# Patient Record
Sex: FEMALE | Race: WHITE | Employment: UNEMPLOYED | URBAN - METROPOLITAN AREA
[De-identification: names, ages, dates, MRNs, and addresses within clinical notes are randomized per-mention and may not be internally consistent; named-entity substitution may affect disease eponyms.]

---

## 2017-03-07 ENCOUNTER — OFFICE VISIT (OUTPATIENT)
Dept: FAMILY MEDICINE CLINIC | Age: 57
End: 2017-03-07

## 2017-03-07 VITALS
DIASTOLIC BLOOD PRESSURE: 76 MMHG | OXYGEN SATURATION: 98 % | SYSTOLIC BLOOD PRESSURE: 118 MMHG | BODY MASS INDEX: 27.6 KG/M2 | WEIGHT: 155.8 LBS | HEART RATE: 68 BPM

## 2017-03-07 DIAGNOSIS — E03.4 HYPOTHYROIDISM DUE TO ACQUIRED ATROPHY OF THYROID: ICD-10-CM

## 2017-03-07 DIAGNOSIS — G89.29 CHRONIC PAIN OF BOTH KNEES: ICD-10-CM

## 2017-03-07 DIAGNOSIS — Z00.00 PHYSICAL EXAM, ANNUAL: Primary | ICD-10-CM

## 2017-03-07 DIAGNOSIS — Z13.6 SCREENING FOR CARDIOVASCULAR CONDITION: ICD-10-CM

## 2017-03-07 DIAGNOSIS — M17.11 PRIMARY OSTEOARTHRITIS OF RIGHT KNEE: ICD-10-CM

## 2017-03-07 DIAGNOSIS — M17.12 PRIMARY OSTEOARTHRITIS OF LEFT KNEE: ICD-10-CM

## 2017-03-07 DIAGNOSIS — Z13.1 SCREENING FOR DIABETES MELLITUS: ICD-10-CM

## 2017-03-07 DIAGNOSIS — M25.562 CHRONIC PAIN OF BOTH KNEES: ICD-10-CM

## 2017-03-07 DIAGNOSIS — M25.561 CHRONIC PAIN OF BOTH KNEES: ICD-10-CM

## 2017-03-07 PROCEDURE — 99396 PREV VISIT EST AGE 40-64: CPT | Performed by: FAMILY MEDICINE

## 2017-03-07 PROCEDURE — 36415 COLL VENOUS BLD VENIPUNCTURE: CPT | Performed by: FAMILY MEDICINE

## 2017-03-07 RX ORDER — CYCLOBENZAPRINE HCL 10 MG
10 TABLET ORAL 2 TIMES DAILY
Qty: 180 TABLET | Refills: 1 | Status: SHIPPED | OUTPATIENT
Start: 2017-03-07 | End: 2017-12-18

## 2017-03-07 RX ORDER — LEVOTHYROXINE SODIUM 88 UG/1
88 TABLET ORAL DAILY
Qty: 90 TABLET | Refills: 3 | Status: SHIPPED | OUTPATIENT
Start: 2017-03-07 | End: 2017-03-10 | Stop reason: SDUPTHER

## 2017-03-07 RX ORDER — NABUMETONE 750 MG/1
750 TABLET, FILM COATED ORAL 2 TIMES DAILY
Qty: 180 TABLET | Refills: 1 | Status: SHIPPED | OUTPATIENT
Start: 2017-03-07 | End: 2017-12-08

## 2017-03-08 LAB
A/G RATIO: 2 (CALC) (ref 0.8–2.6)
ALBUMIN SERPL-MCNC: 4.8 GM/DL (ref 3.5–5.2)
ALP BLD-CCNC: 50 U/L (ref 23–144)
ALT SERPL-CCNC: 17 U/L (ref 0–60)
AST SERPL-CCNC: 24 U/L (ref 0–46)
BILIRUB SERPL-MCNC: 0.3 MG/DL (ref 0–1.2)
BUN / CREAT RATIO: 18 (CALC) (ref 7–25)
BUN BLDV-MCNC: 14 MG/DL (ref 3–29)
CALCIUM SERPL-MCNC: 9.2 MG/DL (ref 8.5–10.5)
CHLORIDE BLD-SCNC: 100 MEQ/L (ref 96–110)
CHOLESTEROL, TOTAL: 245 MG/DL
CO2: 29 MEQ/L (ref 19–32)
COPY(IES) SENT TO:: NORMAL
CREAT SERPL-MCNC: 0.8 MG/DL
GFR SERPL CREATININE-BSD FRML MDRD: 84 ML/MIN/1.73M2
GLOBULIN: 2.4 GM/DL (CALC) (ref 1.9–3.6)
GLUCOSE BLD-MCNC: 68 MG/DL
HDLC SERPL-MCNC: 72 MG/DL
LDL CHOLESTEROL: 146 MG/DL (CALC)
POTASSIUM SERPL-SCNC: 4.6 MEQ/L (ref 3.4–5.3)
SODIUM BLD-SCNC: 142 MEQ/L (ref 135–148)
TOTAL PROTEIN: 7.2 GM/DL (ref 6–8.3)
TRIGL SERPL-MCNC: 135 MG/DL
TSH SERPL DL<=0.05 MIU/L-ACNC: 7.05 MICRO IU/ML (ref 0.4–4)
VLDLC SERPL CALC-MCNC: 27 MG/DL (CALC) (ref 4–38)

## 2017-03-10 ENCOUNTER — HOSPITAL ENCOUNTER (OUTPATIENT)
Dept: PHYSICAL THERAPY | Age: 57
Discharge: HOME | End: 2017-03-31
Attending: ORTHOPAEDIC SURGERY | Admitting: ORTHOPAEDIC SURGERY

## 2017-03-10 DIAGNOSIS — E03.4 HYPOTHYROIDISM DUE TO ACQUIRED ATROPHY OF THYROID: ICD-10-CM

## 2017-03-10 RX ORDER — LEVOTHYROXINE SODIUM 112 UG/1
112 TABLET ORAL DAILY
Qty: 90 TABLET | Refills: 1 | Status: SHIPPED | OUTPATIENT
Start: 2017-03-10 | End: 2017-09-11 | Stop reason: SDUPTHER

## 2017-04-01 ENCOUNTER — HOSPITAL ENCOUNTER (OUTPATIENT)
Dept: OTHER | Age: 57
Discharge: HOME | End: 2017-04-30
Attending: ORTHOPAEDIC SURGERY | Admitting: ORTHOPAEDIC SURGERY

## 2017-08-01 ENCOUNTER — HOSPITAL ENCOUNTER (OUTPATIENT)
Dept: GENERAL RADIOLOGY | Age: 57
Discharge: HOME | End: 2017-08-01
Attending: FAMILY MEDICINE | Admitting: FAMILY MEDICINE

## 2017-08-01 ENCOUNTER — OFFICE VISIT (OUTPATIENT)
Dept: FAMILY MEDICINE CLINIC | Age: 57
End: 2017-08-01

## 2017-08-01 VITALS
WEIGHT: 156.2 LBS | SYSTOLIC BLOOD PRESSURE: 122 MMHG | HEART RATE: 97 BPM | BODY MASS INDEX: 27.67 KG/M2 | TEMPERATURE: 97.2 F | DIASTOLIC BLOOD PRESSURE: 80 MMHG | OXYGEN SATURATION: 98 %

## 2017-08-01 DIAGNOSIS — M25.511 ACUTE PAIN OF RIGHT SHOULDER: Primary | ICD-10-CM

## 2017-08-01 PROCEDURE — 99213 OFFICE O/P EST LOW 20 MIN: CPT | Performed by: FAMILY MEDICINE

## 2017-09-08 ENCOUNTER — TELEPHONE (OUTPATIENT)
Dept: FAMILY MEDICINE CLINIC | Age: 57
End: 2017-09-08

## 2017-09-08 DIAGNOSIS — E03.4 HYPOTHYROIDISM DUE TO ACQUIRED ATROPHY OF THYROID: ICD-10-CM

## 2017-09-11 RX ORDER — LEVOTHYROXINE SODIUM 112 UG/1
112 TABLET ORAL DAILY
Qty: 90 TABLET | Refills: 1 | Status: SHIPPED | OUTPATIENT
Start: 2017-09-11 | End: 2018-03-13 | Stop reason: SDUPTHER

## 2017-11-13 ENCOUNTER — OFFICE VISIT (OUTPATIENT)
Dept: ORTHOPEDIC SURGERY | Age: 57
End: 2017-11-13

## 2017-11-13 ENCOUNTER — HOSPITAL ENCOUNTER (OUTPATIENT)
Dept: OTHER | Age: 57
Discharge: HOME | End: 2017-11-13
Attending: ORTHOPAEDIC SURGERY | Admitting: ORTHOPAEDIC SURGERY

## 2017-11-13 ENCOUNTER — SURG/PROC ORDERS (OUTPATIENT)
Dept: ORTHOPEDIC SURGERY | Age: 57
End: 2017-11-13

## 2017-11-13 ENCOUNTER — TELEPHONE (OUTPATIENT)
Dept: ORTHOPEDIC SURGERY | Age: 57
End: 2017-11-13

## 2017-11-13 VITALS
HEART RATE: 92 BPM | SYSTOLIC BLOOD PRESSURE: 142 MMHG | WEIGHT: 156.31 LBS | BODY MASS INDEX: 27.7 KG/M2 | DIASTOLIC BLOOD PRESSURE: 88 MMHG | HEIGHT: 63 IN

## 2017-11-13 DIAGNOSIS — M25.562 CHRONIC PAIN OF LEFT KNEE: ICD-10-CM

## 2017-11-13 DIAGNOSIS — Q74.1 BIPARTITE PATELLA: ICD-10-CM

## 2017-11-13 DIAGNOSIS — M21.162 ACQUIRED VARUS DEFORMITY KNEE, LEFT: ICD-10-CM

## 2017-11-13 DIAGNOSIS — M17.12 PRIMARY OSTEOARTHRITIS OF LEFT KNEE: ICD-10-CM

## 2017-11-13 DIAGNOSIS — M25.562 CHRONIC PAIN OF LEFT KNEE: Primary | ICD-10-CM

## 2017-11-13 DIAGNOSIS — Z98.890 S/P LEFT KNEE SURGERY: ICD-10-CM

## 2017-11-13 DIAGNOSIS — G89.29 CHRONIC PAIN OF LEFT KNEE: ICD-10-CM

## 2017-11-13 DIAGNOSIS — G89.29 CHRONIC PAIN OF LEFT KNEE: Primary | ICD-10-CM

## 2017-11-13 PROCEDURE — 99214 OFFICE O/P EST MOD 30 MIN: CPT | Performed by: ORTHOPAEDIC SURGERY

## 2017-11-13 PROCEDURE — 73562 X-RAY EXAM OF KNEE 3: CPT | Performed by: ORTHOPAEDIC SURGERY

## 2017-11-13 NOTE — PROGRESS NOTES
stage arthritis which is worsening and is limiting Marcus Yanez's lifestyle due to pain and is unresponsive to conservative treatments. More detailed documentation is in [facility] records (including medical necessity questionnaire scanned into media). Plan (Medical Decision Making):     Diagnosis:  LEFT KNEE DJD (degenerative joint disease): Advanced/Severe/End Stage Arthritis (Symptomatic and Radiographic Osteoarthritis)    I did go over her options including physical therapy, icing, and anti-inflammatories. We talked about brace wear and shoe wear. We talked about injections of cortisone and Synvisc/viscosupplimentation. We talked about surgery. She is failing conservative treatment and she wanted to know more about her surgical options. PRE-OP DISCUSSION for LEFT Knee Total Knee Replacement (TKR): To provide maximum benefit to [facility], all treatment procedures have been achieved by mutual collaboration with shared decision making between [facility] and myself: Dr Carla Addison. Because she failed conservative treatment (outpatient management: injections, braces/orthotics/shoe modification, Physical therapy intervention: ROM, strengthening and flexibility exercises, and medical management: Analgesics, NSAID's, steroids, vitamins and suppliments) we did talk about surgery. I have also discussed with [facility] the other treatment options available to her for this condition. We have today selected to proceed with surgical treatment. She has voiced an understanding that there are other less aggressive treatment options which are available in this situation and she is comfortable with the plan that she has chosen. The goals of TKR surgery are to halt the progression of deformity, reduce pain and improve her health-related quality of life (HRQL). I went over the indication, technique, and incisions involved for TKR surgery.        I stated that I would take out the arthritis and replace the knee with a TKR made of metal and plastic. This is a major operation and does require a commitment to physical therapy after the surgery. I went over the indication, technique, and incisions involved for TKR. I told her that the planned surgery takes about two to two and a half hours. On the day of surgery, I would meet her in the holding area, review the planned procedure and answer all of her questions. I would ghazala the surgical site with my initials. She would be brought back to the operating room where it is cold, there are bright lights, and a lot of people. I told her that she would be made comfortable. I would do the operation, and she would wake up in the recovery room with a bulky dressing on. She would be admitted to the hospital (usual length of stay is 1 - 3 days). While in the hospital, She will receive selena-operative antibiotics and VTE prophylaxis (usually Xarelto or aspirin, compression stockings and intermittent pneumatic compression device (IPC). Her IV is usually discontinued the morning following the surgery. She will receive pain medications and Physical Therapy while in the hospital and also upon discharge from the hospital.    I would like her to ice the knee, elevate her leg, and take the pain medication if needed. I would see her back in the office ten to fourteen days after the surgery to remove the staples and apply steri-strips, take new Xrays and check on her physical therapy progress. I went over the complications of the surgery and this is not an all-inclusive list.  The major complications include:   1). Infection: less than one percent, She would receive antibiotics. She is allergic to Review of patient's allergies indicates no known allergies. 2). Bleeding: less than one percent, I am using a new technique (Tranexamic acid intra-op) which has considerably decreased the surgical blood loss and decreased the transfusion rates. fourth phase is Swelling and Warmth. Around 4-6 weeks, patients will start to compare their total knee replacement knee with their other knee. They note that the total knee replacement knee is still a bit more swollen and a bit more warm compared to the other knee. This is normal.    Fifth phase: The fifth phase is \"my knee makes noises\" phase. It is common for patients to note that the total knee replacement makes noises. I have answered all of her questions to her apparent satisfaction. She is considering her options and will get back to me for a convenient surgical date. She will need medical clearance prior to the surgery. PRE-OP Consultations:     [x]  Medical Consultation: Giles Bertrand MD for medical clearance     She signed the informed consent form. Regarding discharge planning following her LEFT total knee replacement:  She is planning to go HOME following her hospitalization/surgery. RAPT  RISK ASSESSMENT and PREDICTION TOOL    Name: Amirah April  YOB: 1960  Surgeon: Dr Ted Atkinson: Destination at discharge from acute care predicted by score. Score < 6  = extended inpatient rehabilitation  Score 6 - 9  = additional intervention to discharge directly home (Rehab in the home)  Score > 9  = directly home      Patient's Preference Prediction Score Agreed destination   [address] [facility]  Date: 11/13/2017            [x]  PAT ORDERS Placed by Dr Hung Kuo 11/13/17      [x]  Single Item Health Literacy Screening 11/13/17      Single Item Health Literacy Screening (SILS2)  Questionnaire     \"How comfortable are you filling out medical forms by yourself? \"    []  Extremely    [x]  Quite a bit    []  Somewhat    []  A little bit    []  Not at all     [x]  Patient Reported Outcome Surveys completed and scanned into media:    [x]  PROMIS 11/13/17     [x]  KOOS 11/13/17     [x]  Obstructive Sleep Apnea Questionnaire 11/13/17          Risk of NEEL Scoring Criteria:       [x] Low risk:  Yes to 0  2 questions     [] Intermediate risk:  Yes to 3  4 questions     [] High risk:  Yes to 5  8 questions          Return to Clinic/Follow - Up:  Bren Davis was asked to make a follow-up appointment:    [x] After her left knee replacement    Bren Davis was instructed to call the office if her symptoms worsen or if new symptoms appear prior to the surgery. She is specifically instructed to contact the office between now & her scheduled surgery if she has concerns related to her condition. She is welcome to call for an appointment sooner if she has any additional concerns or questions. Patient Education Materials Provided:  [x] TKR Booklet, Anatomic Drawings, treatment algorithm and pre-operative consultation    Patient Instructions       Dr Aidan Bee surgery will be at: [facility] PRIMARY CARE PHYSICIAN FOR A PRE-OPERATIVE HISTORY & PHYSICAL. A history & physical form must be taken to your primary care physician at the time of your appointment. Your doctor will fax your results to the [facility] and give you a copy to bring with you on the day of the surgery. These are only good for 30 days. If you are having a TKR there will be additional testing at the [facility]  The hospital will contact you to set up a time for the testing, physical exam and an educational class. DO NOT EAT OR DRINK ANYTHING AFTER MIDNIGHT THE NIGHT BEFORE YOUR SURGERY. CHECK WITH YOUR PRIMARY CARE PHYSICIAN TO SEE WHAT MEDICATIONS YOU SHOULD TAKE ON THE MORNING OF SURGERY. TAKE THESE MEDICATIONS WITH A SMALL SIP OF WATER. DO NOT TAKE ANY ASPIRIN SEVEN (7) DAYS PRIOR TO SURGERY (unless your Cardiologist advises you to take ASPIRIN).      DO NOT TAKE ANY IBUPROFEN TYPE PRODUCTS (like Advil, Aleve, Naproxen, etc.)  FIVE (5) DAYS PRIOR TO SURGERY. IF YOU NEED SOMETHING FOR PAIN,   PLEASE USE EXTRA-STRENGTH TYLENOL. YOU WILL NEED TO HAVE SOMEONE [facility] ON THE DAY OF SURGERY. IF YOU WERE PROVIDED A SLING, BRACE, ICE MACHINE AND/OR CRUTCHES/WALKER: PLEASE BRING THEM WITH YOU TO [facility] [address] ON THE DAY OF SURGERY. IT IS YOUR RESPONSIBLE TO CHECK YOUR BENEFITS AND VERIFY IF THE [facility] IS IN YOUR NETWORK. General Information and Patient Checklist    History & Physical   All patients who are receiving anesthesia are required to have this form completed by their Primary Care Physician. A history and physical must be completed within 30 days of your scheduled surgery. Depending on your age and medical conditions, you may be required to have additional tests, like an EKG, chest X-ray or blood work. This is outlined on the form for your physician. It is usually a good idea to make sure that your doctor gives you a copy of the completed form for you to bring with you to the hospital on the day of your surgery. Many doctors will fax us the completed form, but having your own copy will avoid delays on the day of surgery. IMPORTANT: If you have a pacemaker or implantable defibrillator, another special form is required to be completed by your cardiologist.    Medication List   Medications can often affect your anesthetic and surgical outcome. You will be asked to make a complete list of ALL medications that you are currently taking. This includes over-the-counter and herbal supplements, as they can have effects on anesthesia, bleeding and healing just like prescription medications. Arrival   You will be asked to arrive at the hospital 2 hours prior to your scheduled procedure. This will enable us to spend adequate time with you and your family during the pre-op process. Eating, Drinking and Misc.  No food or liquids after midnight the night prior to surgery.     No have prescriptions which are written and signed by your doctor (Dr. Carmel Connor). This means that you must call ahead and come in to the office to  the paper prescription and take it to your pharmacy. We are sorry for any inconvenience but this is now the law. Samanta Holloway MD  Board Certified Orthopaedic Surgeon  Knee and Shoulder Surgery Specialist    Contact Information:  Ce Quinn,   [phone], [address]         IMPORTANT NARCOTIC INFORMATION: PLEASE READ     [x] DO NOT share your prescription medication with anyone! Sharing is illegal.  The prescription dose is based on your age, weight, and health problems. Sharing your narcotic prescription can lead to accidental death of the individual for which the prescription was not prescribed. You may not know about his/her addiction problem. [x] Always use the same pharmacy when filling your narcotic prescriptions. [x] DO NOT mix your narcotic prescription with alcohol. Mixing the narcotic prescription medication with alcohol causes depressive effects including breathing problems, organ malfunction, and cardiac arrest.     [x] Always keep your narcotic medication in a locked, secured location. Keep your medication private. This is to avoid individuals from taking your medication without your knowledge. This medication is highly sought after and locking your prescriptions will protect you from being a target of crime. [x] DO NOT stock pile your medication. This also will protect you from being a target of crime. [x] Dispose of any unused medications properly. Do not flush the medications. Look for appropriate waste collection programs in your community and drug take back events. [x] DO NOT drive while taking narcotic pain medication or muscle relaxers.          If you or someone you know struggles with weight issues and joint pain, please check out this important documentary:      [employer] Living\" =  Http://startmovingVIS Researchving.Setred       (YOUTUBE video SMSL FULL SD)         FOR MORE INFORMATION, CHECK OUT THIS RESOURCE    For your convenience, Dr. Carla Addison has provided the following link that may be helpful for you if you would like more information on your Orthopaedic condition:    www. Orthoinfo. org           The phases following a total knee replacement that most patients often encounter. Briefly:    First Phase: First phase is The Pain Phase. There is plenty of pain medication (and pharmacy can be consulted if needed). TKR is a painful operation however pain management has improved significantly. Second Phase: The second phase is Not Sleeping Phase. It is common for total knee replacement patients not to sleep well after total knee replacement. This can go on for several months. Third Phase: The third phase is Depression. The \"not sleeping at night phase\" leads to the third phase in which patients often experience depression/the blues, feeling that \"this will never get any better\". It will get better. Fourth Phase: The fourth phase is Swelling and Warmth. Around 4-6 weeks, patients will start to compare their total knee replacement knee with their other knee. They note that the total knee replacement knee is still a bit more swollen and a bit more warm compared to the other knee. This is normal.    Fifth Phase: The fifth phase is \"my knee makes noises\" phase. It is common for patients to note that the total knee replacement makes noises. Orders Placed This Encounter   Procedures    XR KNEE LEFT (3 VIEWS)     Standing Status:   Future     Number of Occurrences:   1     Standing Expiration Date:   11/13/2018     Order Specific Question:   Reason for exam:     Answer:   Pain       Refills/New Prescriptions:  No orders of the defined types were placed in this encounter.     Today's prescription medications will be e-scribed (when appropriate) to the Patient's Preferred Pharmacy:   [address]  Phone: [phone] Fax: [phone]    [facility] [address], [address] [place] [address] 1  [place] [address]  Phone: [phone] Fax: [phone]         Shaheen Bartholomew MD  Board Certified Orthopaedic Surgeon  Knee and Shoulder Surgery Specialist  Electronically signed by Shaheen Bartholomew MD on 11/13/2017 at 3:49 PM     Contact Information:  Arleth Huang,   [phone], [address]    This dictation was performed with a verbal recognition program [facility]) and it was checked for errors. It is possible that there are still dictated errors within this office note. If so, please bring any errors to my attention for an addendum. All efforts were made to ensure that this office note is accurate. I have personally performed and/or participated in the history, physical exam and medical decision making and reviewed all pertinent clinical information unless otherwise noted.

## 2017-11-13 NOTE — PROGRESS NOTES
Medical Necessity Documentation for TKR surgery: All forms scanned into media today      Prior therapeutic interventions:    [x]  Over the counter treatments:      [x]  Oral supplements: (GC, CS, etc)      [x]  Ointments: (Sports cream, lidocaine creams, etc.)     [x]  Vitamin D     [x]  Tart Cherry Juice    []  Ice therapy    []  Heat therapy    []  Weight loss attempts    [x]  Activity modification    [x]  Ambulatory aids     []  Cane     [x]  Brace     [x]  Other: knee sleeve     [x]  Physical therapy    [x]  Medications      []  Tylenol     [x]  NSAID's: Aleve       If not: Contraindications: Allergy,        Bleeding,        Gastric ulcer,       Blood thinners     []  Narcotics:          [] Yes    [x] No     []  Injections     []  Steroids     []  Viscosupplimentation (Synvisc, Orthovisc, Euflexxa type)    []  Arthroscopic surgery       []  Date:     []  Alternative treatments:      []  Yoga      []  Water therapy     []  Other:     []  Pain in other sites:      []  Other lower extremity      []  Low Back Pain     []  Other:      [x]  Does your knee feel unstable? [x] Yes    [] No     []  Have you had a fall? [] Yes    [x] No     Regarding discharge planning following her LEFT total knee replacement:  She is planning to go HOME following her hospitalization/surgery. RAPT  RISK ASSESSMENT and PREDICTION TOOL    Name: Scot Welch  YOB: 1960  Surgeon: Dr Dunia Quinn         Value Score    1). What is your age group? 50 - 65 years  = 2      66 - 75 years = 1     > 75 years = 0       Your score = 2   2). Gender? Male = 2     Female = 1       Your score = 1   3). How far on average can you walk? Two blocks or more (+/- rest) = 2    (a block is 200 gwmggw=926 ft)  1 - 2 blocks (+/- rest) = 1     Housebound (most of time) = 0       Your score = 2   4). Which gait aid do you use?  None = 2    (more often than not) Single-point stick = 1     Crutches/walker = 0       Your score = 2   5). Do you use community supports? None or one per week = 1    (home help, meals on wheels, district nursing) Two or more per week = 0       Your score = 1   6). Will you live with someone who can care for you after your operation? yes = 3     no = 0       Your score = 3    Your Total Score (out of 12) = 11       Key: Destination at discharge from acute care predicted by score. Score < 6  = extended inpatient rehabilitation  Score 6 - 9  = additional intervention to discharge directly home (Rehab in the home)  Score > 9  = directly home      Patient's Preference Prediction Score Agreed destination   HOME [facility]  Date: 11/13/17           [x]  Single Item Health Literacy Screening 11/13/17    Single Item Health Literacy Screening (SILS2)  Questionnaire     \"How comfortable are you filling out medical forms by yourself? \"    [x]  Extremely    []  Quite a bit    []  Somewhat    []  A little bit    []  Not at all     [x]  Patient Reported Outcome Surveys completed and scanned into media:    [x]  PROMIS 11/13/17     [x]  KOOS 11/13/17     [x]  NEEL Questionaire 11/13/17         Obstructive Sleep Apnea (NEEL) Questionnaire    Snoring? Do you snore loudly (loud enough to be heard through closed doors, or your bed partner elbows you for snoring at night)? [] YES   [x] NO    Tired? Do you often feel tired, fatigued, or sleepy during the daytime (such as falling asleep during driving)? [] YES   [x] NO    Observed? Has anyone observed you stop breathing or choking/gasping during your sleep? [] YES   [x] NO    Pressure? Do you have or are being treated for high blood pressure? [] YES   [x] NO    Neck Size? (measured around Rizwans apple)  For male, is your shirt collar 17 inches or larger? For female, is your shirt collar 16 inches or larger? [] YES   [x] NO    Age older than 48years old? [x] YES   [] NO    Gender = Male   [] YES   [x] NO    Body Mass Index more than 35 kg/m2?    [] YES   [x] NO    Risk of NEEL Scoring Criteria: 1/8       [x] Low risk:  Yes to 0  2 questions     [] Intermediate risk:  Yes to 3  4 questions     [] High risk:  Yes to 5  8 questions

## 2017-11-13 NOTE — TELEPHONE ENCOUNTER
[x]  OARRS ([place] Automated Rx Reporting System):     OARRS Report on Roshni Leon was run today: 11/13/17. Active Cumulative Morphine Equivalent (No patient found) was reviewed by Dr. Caprice Rinaldi. OARRS report scanned into media.
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
## 2017-11-13 NOTE — PATIENT INSTRUCTIONS
Dr Bolivar Fails surgery will be at: [facility] in [facility] PRIMARY CARE PHYSICIAN FOR A PRE-OPERATIVE HISTORY & PHYSICAL. A history & physical form must be taken to your primary care physician at the time of your appointment. Your doctor will fax your results to the [facility] and give you a copy to bring with you on the day of the surgery. These are only good for 30 days. If you are having a TKR there will be additional testing at the [facility]  The hospital will contact you to set up a time for the testing, physical exam and an educational class. DO NOT EAT OR DRINK ANYTHING AFTER MIDNIGHT THE NIGHT BEFORE YOUR SURGERY. CHECK WITH YOUR PRIMARY CARE PHYSICIAN TO SEE WHAT MEDICATIONS YOU SHOULD TAKE ON THE MORNING OF SURGERY. TAKE THESE MEDICATIONS WITH A SMALL SIP OF WATER. DO NOT TAKE ANY ASPIRIN SEVEN (7) DAYS PRIOR TO SURGERY (unless your Cardiologist advises you to take ASPIRIN). DO NOT TAKE ANY IBUPROFEN TYPE PRODUCTS (like Advil, Aleve, Naproxen, etc.)  FIVE (5) DAYS PRIOR TO SURGERY. IF YOU NEED SOMETHING FOR PAIN,   PLEASE USE EXTRA-STRENGTH TYLENOL. YOU WILL NEED TO HAVE SOMEONE [facility] ON THE DAY OF SURGERY. IF YOU WERE PROVIDED A SLING, BRACE, ICE MACHINE AND/OR CRUTCHES/WALKER: PLEASE BRING THEM WITH YOU TO [facility] [address] ON THE DAY OF SURGERY. IT IS YOUR RESPONSIBLE TO CHECK YOUR BENEFITS AND VERIFY IF THE [facility] IS IN YOUR NETWORK. General Information and Patient Checklist    History & Physical   All patients who are receiving anesthesia are required to have this form completed by their Primary Care Physician. A history and physical must be completed within 30 days of your scheduled surgery.   Depending on your age and medical conditions, you may be required to have additional tests, like an EKG, chest X-ray or blood work. This is outlined on the form for your physician. It is usually a good idea to make sure that your doctor gives you a copy of the completed form for you to bring with you to the hospital on the day of your surgery. Many doctors will fax us the completed form, but having your own copy will avoid delays on the day of surgery. IMPORTANT: If you have a pacemaker or implantable defibrillator, another special form is required to be completed by your cardiologist.    Medication List   Medications can often affect your anesthetic and surgical outcome. You will be asked to make a complete list of ALL medications that you are currently taking. This includes over-the-counter and herbal supplements, as they can have effects on anesthesia, bleeding and healing just like prescription medications. Arrival   You will be asked to arrive at the hospital 2 hours prior to your scheduled procedure. This will enable us to spend adequate time with you and your family during the pre-op process. Eating, Drinking and Misc.  No food or liquids after midnight the night prior to surgery.  No smoking for 24 hours prior to surgery.  No alcoholic beverages for 24 hours prior to surgery.  Remove nail polish and make-up. Who & What Should I Bring?  ALL patients receiving anesthesia must have a , 25years of age or older, to drive them home.  It is recommended that a responsible individual be with the patient for 24 hours following surgery.  Wear loose and comfortable clothing, generally an oversized button-up shirt is a good idea for any patient, as many procedures make pulling a shirt over your head difficult. Pull-on or sweat pants are a good idea, too.  All jewelry must be removed prior to surgery and is generally better off left at home. This includes wedding rings and all body piercings.  DO NOT bring any valuable items with you.     Bring your

## 2017-11-20 ENCOUNTER — TELEPHONE (OUTPATIENT)
Dept: ORTHOPEDIC SURGERY | Age: 57
End: 2017-11-20

## 2017-11-27 ENCOUNTER — TELEPHONE (OUTPATIENT)
Dept: FAMILY MEDICINE CLINIC | Age: 57
End: 2017-11-27

## 2017-11-27 ENCOUNTER — OFFICE VISIT (OUTPATIENT)
Dept: FAMILY MEDICINE CLINIC | Age: 57
End: 2017-11-27

## 2017-11-27 VITALS
BODY MASS INDEX: 27.11 KG/M2 | OXYGEN SATURATION: 98 % | DIASTOLIC BLOOD PRESSURE: 86 MMHG | WEIGHT: 153 LBS | HEART RATE: 102 BPM | SYSTOLIC BLOOD PRESSURE: 118 MMHG | HEIGHT: 63 IN

## 2017-11-27 DIAGNOSIS — M17.12 PRIMARY OSTEOARTHRITIS OF LEFT KNEE: Primary | ICD-10-CM

## 2017-11-27 DIAGNOSIS — Z01.818 PREOP EXAMINATION: ICD-10-CM

## 2017-11-27 PROCEDURE — 99242 OFF/OP CONSLTJ NEW/EST SF 20: CPT | Performed by: FAMILY MEDICINE

## 2017-11-27 ASSESSMENT — PATIENT HEALTH QUESTIONNAIRE - PHQ9
1. LITTLE INTEREST OR PLEASURE IN DOING THINGS: 0
2. FEELING DOWN, DEPRESSED OR HOPELESS: 0
SUM OF ALL RESPONSES TO PHQ QUESTIONS 1-9: 0
SUM OF ALL RESPONSES TO PHQ9 QUESTIONS 1 & 2: 0

## 2017-11-27 NOTE — PROGRESS NOTES
Subjective:   Chief Complaint:     Hardeep Antoine is a 56 y.o. female who presents for a  physical examination. History of Present Illness:    Patient with chronic left knee pain and instability. Scheduled for left total knee replacement on 12/6/17. Needs medical clearance. Past Medical History:   Diagnosis Date    Arthritis     Hypothyroidism         Review of patient's past surgical history indicates:     Past Surgical History:   Procedure Laterality Date    KNEE SURGERY Left 1976    by Dr. Blanca Avila in [facility]    by Dr. Blanca Avila in [place]                                                   Current Outpatient Prescriptions   Medication Sig Dispense Refill    levothyroxine (SYNTHROID) 112 MCG tablet Take 1 tablet by mouth daily 90 tablet 1    nabumetone (RELAFEN) 750 MG tablet Take 1 tablet by mouth 2 times daily 180 tablet 1    cyclobenzaprine (FLEXERIL) 10 MG tablet Take 1 tablet by mouth 2 times daily 180 tablet 1    Misc Natural Products (OSTEO BI-FLEX ADV JOINT SHIELD PO) Take by mouth      Naproxen Sodium (ALEVE PO) Take by mouth       No current facility-administered medications for this visit. No Known Allergies    Social History   Substance Use Topics    Smoking status: Former Smoker    Smokeless tobacco: Never Used      Comment: quit 2010    Alcohol use Not on file        No family history on file. Review Of Systems    Skin: no abnormal pigmentation, rash, scaling, itching, masses, hair or nail changes  Eyes: negative  Ears/Nose/Throat: negative  Respiratory: negative  Cardiovascular: negative  Gastrointestinal: negative  Genitourinary: negative  Musculoskeletal: negative  Neurologic: negative  Psychiatric: negative  Hematologic/Lymphatic/Immunologic: negative  Endocrine: negative       Objective:      /86 (Site: Left Arm, Position: Sitting, Cuff Size: Medium Adult)   Pulse 102   Ht 5' 3\" (1.6 m)   Wt 153 lb (69.4 kg)   SpO2 98%   Breastfeeding?  No BMI 27.10 kg/m²   General appearance - healthy, alert, no distress  Skin - Skin color, texture, turgor normal. No rashes or lesions. Head - Normocephalic. No masses, lesions, tenderness or abnormalities  Eyes - conjunctivae/corneas clear. PERRL, EOM's intact. Ears - External ears normal. Canals clear. TM's normal.  Nose/Sinuses - Nares normal. Septum midline. Mucosa normal. No drainage or sinus tenderness. Oropharynx - Lips, mucosa, and tongue normal. Teeth and gums normal.  Neck - Neck supple. No adenopathy. Thyroid symmetric, normal size,  Back - Back symmetric, no curvature. ROM normal. No CVA tenderness. Lungs - Percussion normal. Good diaphragmatic excursion. Lungs clear  Heart - Regular rate and rhythm, with no rub, murmur or gallop noted. Abdomen - Abdomen soft, non-tender. BS normal. No masses, organomegaly  Extremities - Extremities normal. No deformities, edema, or skin discoloration  Musculoskeletal - Spine ROM normal. Muscular strength intact. Peripheral pulses - radial=4/4  Neuro - Gait normal. Reflexes normal and symmetric.  Sensation grossly normal.  No focal weakness           Assessment:    left knee DJD  Knee instability              Plan:   Medically optimized for surgery

## 2017-11-27 NOTE — TELEPHONE ENCOUNTER
Please call Dr. Adelina Lei' office ( [facility]) to see if there is a physical form I need to sign to clear her for surgery.     Thanls

## 2017-11-28 ENCOUNTER — TELEPHONE (OUTPATIENT)
Dept: FAMILY MEDICINE CLINIC | Age: 57
End: 2017-11-28

## 2017-11-28 ENCOUNTER — HOSPITAL ENCOUNTER (OUTPATIENT)
Dept: PREADMISSION TESTING | Age: 57
Discharge: HOME | End: 2017-11-28
Attending: ORTHOPAEDIC SURGERY | Admitting: ORTHOPAEDIC SURGERY

## 2017-11-28 VITALS
TEMPERATURE: 98.2 F | OXYGEN SATURATION: 99 % | DIASTOLIC BLOOD PRESSURE: 78 MMHG | BODY MASS INDEX: 27.97 KG/M2 | HEIGHT: 62 IN | SYSTOLIC BLOOD PRESSURE: 137 MMHG | HEART RATE: 82 BPM | WEIGHT: 152 LBS

## 2017-11-28 LAB
A/G RATIO: 1.4 (ref 1.1–2.2)
ABO/RH: NORMAL
ALBUMIN SERPL-MCNC: 4.3 G/DL (ref 3.4–5)
ALP BLD-CCNC: 53 U/L (ref 40–129)
ALT SERPL-CCNC: 10 U/L (ref 10–40)
ANION GAP SERPL CALCULATED.3IONS-SCNC: 10 MMOL/L (ref 3–16)
ANTIBODY SCREEN: NORMAL
APTT: 31.4 SEC (ref 24.1–34.9)
AST SERPL-CCNC: 16 U/L (ref 15–37)
BACTERIA: ABNORMAL /HPF
BASOPHILS ABSOLUTE: 0.1 K/UL (ref 0–0.2)
BASOPHILS RELATIVE PERCENT: 2.7 %
BILIRUB SERPL-MCNC: 0.3 MG/DL (ref 0–1)
BILIRUBIN URINE: NEGATIVE
BLOOD, URINE: NEGATIVE
BUN BLDV-MCNC: 10 MG/DL (ref 7–20)
C-REACTIVE PROTEIN: 0.9 MG/L (ref 0–5.1)
CALCIUM SERPL-MCNC: 9.3 MG/DL (ref 8.3–10.6)
CHLORIDE BLD-SCNC: 100 MMOL/L (ref 99–110)
CLARITY: CLEAR
CO2: 28 MMOL/L (ref 21–32)
COLOR: YELLOW
CREAT SERPL-MCNC: 0.5 MG/DL (ref 0.6–1.1)
EKG ATRIAL RATE: 71 BPM
EKG DIAGNOSIS: NORMAL
EKG P AXIS: 50 DEGREES
EKG P-R INTERVAL: 162 MS
EKG Q-T INTERVAL: 432 MS
EKG QRS DURATION: 82 MS
EKG QTC CALCULATION (BAZETT): 469 MS
EKG R AXIS: -4 DEGREES
EKG T AXIS: 10 DEGREES
EKG VENTRICULAR RATE: 71 BPM
EOSINOPHILS ABSOLUTE: 0.1 K/UL (ref 0–0.6)
EOSINOPHILS RELATIVE PERCENT: 2 %
EPITHELIAL CELLS, UA: ABNORMAL /HPF
GFR AFRICAN AMERICAN: >60
GFR NON-AFRICAN AMERICAN: >60
GLOBULIN: 3 G/DL
GLUCOSE BLD-MCNC: 95 MG/DL (ref 70–99)
GLUCOSE URINE: NEGATIVE MG/DL
HCT VFR BLD CALC: 41.4 % (ref 36–48)
HEMOGLOBIN: 13.7 G/DL (ref 12–16)
INR BLD: 0.96 (ref 0.85–1.15)
KETONES, URINE: NEGATIVE MG/DL
LEUKOCYTE ESTERASE, URINE: NEGATIVE
LYMPHOCYTES ABSOLUTE: 1.1 K/UL (ref 1–5.1)
LYMPHOCYTES RELATIVE PERCENT: 22.2 %
MCH RBC QN AUTO: 30.9 PG (ref 26–34)
MCHC RBC AUTO-ENTMCNC: 33 G/DL (ref 31–36)
MCV RBC AUTO: 93.5 FL (ref 80–100)
MICROSCOPIC EXAMINATION: ABNORMAL
MONOCYTES ABSOLUTE: 0.3 K/UL (ref 0–1.3)
MONOCYTES RELATIVE PERCENT: 5.8 %
MUCUS: ABNORMAL /LPF
NEUTROPHILS ABSOLUTE: 3.5 K/UL (ref 1.7–7.7)
NEUTROPHILS RELATIVE PERCENT: 67.3 %
NITRITE, URINE: NEGATIVE
PDW BLD-RTO: 13.5 % (ref 12.4–15.4)
PH UA: 6.5
PLATELET # BLD: 328 K/UL (ref 135–450)
PMV BLD AUTO: 9 FL (ref 5–10.5)
POTASSIUM SERPL-SCNC: 4.4 MMOL/L (ref 3.5–5.1)
PROTEIN UA: NEGATIVE MG/DL
PROTHROMBIN TIME: 10.8 SEC (ref 9.6–13)
RBC # BLD: 4.42 M/UL (ref 4–5.2)
RBC UA: ABNORMAL /HPF (ref 0–2)
SEDIMENTATION RATE, ERYTHROCYTE: 30 MM/HR (ref 0–30)
SODIUM BLD-SCNC: 138 MMOL/L (ref 136–145)
SPECIFIC GRAVITY UA: 1.02
TOTAL PROTEIN: 7.3 G/DL (ref 6.4–8.2)
UROBILINOGEN, URINE: 0.2 E.U./DL
WBC # BLD: 5.1 K/UL (ref 4–11)
WBC UA: ABNORMAL /HPF (ref 0–5)

## 2017-11-28 PROCEDURE — 93010 ELECTROCARDIOGRAM REPORT: CPT | Performed by: INTERNAL MEDICINE

## 2017-11-28 RX ORDER — CHLORHEXIDINE GLUCONATE 0.12 MG/ML
15 RINSE ORAL 2 TIMES DAILY
Status: CANCELLED | OUTPATIENT
Start: 2017-12-05 | End: 2017-11-29

## 2017-11-28 NOTE — PROGRESS NOTES
Snoring? Do you snore loudly (loud enough to be heard through closed doors, or your bed partner elbows you for snoring at night)? No    Tired? Do you often feel tired, fatigued, or sleepy during the daytime (such as falling asleep during driving)? No    Observed? Has anyone observed you stop breathing or choking/gasping during your sleep? No    Pressure? Do you have or are being treated for high blood pressure? No    Neck Size? (measured around Rizwans apple)  For male, is your shirt collar 17 inches or larger? For female, is your shirt collar 16 inches or larger? No    Age older than 48years old? Yes    Gender = Male  No    Body Mass Index more than 35 kg/m2?   No    Risk of NEEL Scoring criteria:    [x] Low risk:  Yes to 0  2 questions    [] Intermediate risk:  Yes to 3  4 questions    [] High risk:  Yes to 5  8 questions

## 2017-11-28 NOTE — PROGRESS NOTES
[address]                          Date of Procedure 12/6/17 Time of Procedure 09:45 a.m. PRIOR TO PROCEDURE DATE:  1. Please follow any guidelines/instructions prior to your procedure as advised by your surgeon. 2. Arrange for someone to drive you home and be with you for the first 24 hours after discharge for your safety after your procedure for which you received sedation. Ensure it is someone we can share information with regarding your discharge. 3. You must contact your surgeon for instructions IF:   You are taking any blood thinners, aspirin, anti-inflammatory or vitamin E.   There is a change in your physical condition such as a cold, fever, rash, cuts, sores or any other infection, especially near your surgical site. 4. Do not drink alcohol the day before or day of your procedure. 5. A Pre-op History and Physical for surgery MUST be completed by your Physician or Urgent Care within 30 days of your procedure date. Please bring a copy with you on the day of your procedure and along with any other testing performed. THE DAY OF YOUR PROCEDURE:  1. Follow instructions for ARRIVAL TIME as DIRECTED BY YOUR SURGEON. If your surgeon does not give you a specific arrival time, please arrive at 7:45 a.m.    2. Enter the MAIN entrance from [facility] and follow the signs to the free [employer] or [employer] parking (offered free of charge 6am-5pm). 3. Enter the Main Entrance of the hospital (do NOT enter from the lower level of the parking garage). Upon entrance, check in with the  at the main desk on your left. If no one is available at the desk, proceed into the [facility] Waiting Room and go through the door directly into the [facility]. There is a Check-in desk ACROSS from Room 5 (marked with a sign hanging from the ceiling).  The phone number for the surgery center is [phone].    4. DO NOT EAT OR DRINK ANYTHING AFTER MIDNIGHT (exception would be medication instructions below only)     5. MEDICATIONS    Take the following medications with a SMALL sip of water: Take Levothyroxine.  Use your usual dose of inhalers the morning of surgery. BRING your rescue inhaler with you to hospital.    Anesthesia does NOT want you to take insulin the morning of surgery. They will control your blood sugar while you are at the hospital. Please contact your ordering physician for instructions regarding your insulin the night before your procedure. If you have an insulin pump, please keep it set on basal rate. 6. Do not swallow water when brushing teeth. No gum, candy, mints or ice chips. Refrain from smoking or at least decrease the amount. 7. Dress in loose, comfortable clothing appropriate for redressing after your procedure. Do not wear jewelry (including body piercings), make-up (especially NO eye make-up), fingernail polish (NO toenail polish if foot/leg surgery), lotion, powders or metal hairclips. 8. Dentures, glasses, or contacts will need to be removed before your procedure. Bring cases for your glasses, contacts, dentures, or hearing aids to protect them while you are in surgery. 9. If you use a CPAP, please bring it with you on the day of your procedure. 10. We recommend that valuable personal  belongings, such as credit cards, cash, cell phones, e-tablets or jewelry, be left at home during your stay. The hospital will not be responsible for valuables that are not secured in the hospital safe. However, if your insurance requires a co-pay, you may want to bring a method of payment, i.e. Check or credit card, if you wish to pay your co-pay the day of surgery. 11. If you are to stay overnight, you may bring a bag with personal items. Please have any large items you may need brought in by your family after your arrival to your hospital room.     12. If you have a Living Will or Durable Power of , please

## 2017-11-28 NOTE — TELEPHONE ENCOUNTER
Left message for patient at 1:42 PM.  Explained that the EKG is actually normal and she still cleared for surgery.

## 2017-11-28 NOTE — PROGRESS NOTES
The following educational items and goals will be achieved upon completion of the patient's Pre-admission testing experience:             Identify the learner who is being assessed for education:  patient                    Ability to Learn:  Exhibits ability to grasp concepts and respond to questions: High  Ready to Learn: Yes  calm   Preferred Method of Learning:  written  Barriers to Learning: Verbalizes interest  Special Considerations due to cultural, Jew, spiritual beliefs:  No  Language:  English  :  Libby Bhakta  [x] Appropriate evaluation / integration of data as delineated by ASPAN Standards of Perianesthesia Nursing Practice    Pain scale and pain management   [x]Patient verbalizes understanding of pain scale and pain management  [x]Pre-operative determination of patients anticipated Post-Operative pain goal:   less than 4 of 10 on 10 point scale post op goal  [] Other     Medication(s) - Compliance with preop medication instructions  [x] Patient verbalizes understanding of preop medications (see [facility] Presurgical Instructions)    Instructions, Pre op                                                                                            [x] Patient verbalizes understanding of presurgical instructions as reviewed with phone interview nurse or in-person nurse review    Fall Risk Potential, Preoperatively                                                                                   []No preoperative risk identified  []Preop risk identified:                    []Sensory deficit        []Motor deficit        []Balance problem        []Home medication        []Uses assistive device                    []History of a Fall within the last 30 days    Goal(s) for fall prevention:  [x]Prevent fall or injury by requesting assistance with activities of daily living  [x]Patient / Significant other verbalizes understanding the need to call for assistance prior to getting out of bed during hospitalization      Infection Precautions                                                                                            [x] Patient understands implementation of Surgical Site Infection precautions (see [facility] Presurgical Instructions)     Patient Safety  [x] Patient identification verified  [x] Site verified    Instructions - Discharge Planning for Outpatients  [x] Patient / significant other voices understanding of home care and follow up procedures  [x] Encourage patient / significant other to review discharge instructions the day after procedure due to sedation on day of surgery    Anticipated Special Needs upon discharge:        [] Cooling device        [] Crutches       [x] Walker        [] Wound Support device        [] Drain        [] Other       Instructions - Discharge Planning for Admitted patients  [x] Patient / significant other understands plan for admission after surgery  [] Patient / significant other understands plan for anticipated discharge dispostion        11/28/2017 9:20 AM Marshall Winters

## 2017-11-29 LAB
MRSA SCREEN RT-PCR: NORMAL
URINE CULTURE, ROUTINE: NORMAL

## 2017-12-01 ENCOUNTER — OFFICE VISIT (OUTPATIENT)
Dept: FAMILY MEDICINE CLINIC | Age: 57
End: 2017-12-01

## 2017-12-01 VITALS
HEART RATE: 77 BPM | OXYGEN SATURATION: 98 % | WEIGHT: 151.9 LBS | BODY MASS INDEX: 27.95 KG/M2 | HEIGHT: 62 IN | SYSTOLIC BLOOD PRESSURE: 118 MMHG | DIASTOLIC BLOOD PRESSURE: 80 MMHG

## 2017-12-01 DIAGNOSIS — J06.9 VIRAL URI: Primary | ICD-10-CM

## 2017-12-01 PROCEDURE — 99213 OFFICE O/P EST LOW 20 MIN: CPT | Performed by: FAMILY MEDICINE

## 2017-12-01 RX ORDER — AZITHROMYCIN 250 MG/1
TABLET, FILM COATED ORAL
Qty: 1 PACKET | Refills: 0 | Status: SHIPPED | OUTPATIENT
Start: 2017-12-01 | End: 2017-12-08 | Stop reason: ALTCHOICE

## 2017-12-01 RX ORDER — FLUTICASONE PROPIONATE 50 MCG
2 SPRAY, SUSPENSION (ML) NASAL 2 TIMES DAILY
Qty: 1 BOTTLE | Refills: 0 | Status: SHIPPED | OUTPATIENT
Start: 2017-12-01 | End: 2017-12-18

## 2017-12-06 PROBLEM — Z96.652 S/P TKR (TOTAL KNEE REPLACEMENT) USING CEMENT, LEFT: Status: ACTIVE | Noted: 2017-12-06

## 2017-12-08 ENCOUNTER — TELEPHONE (OUTPATIENT)
Dept: PHARMACY | Facility: CLINIC | Age: 57
End: 2017-12-08

## 2017-12-08 ENCOUNTER — CARE COORDINATION (OUTPATIENT)
Dept: CASE MANAGEMENT | Age: 57
End: 2017-12-08

## 2017-12-08 DIAGNOSIS — Z96.652 S/P TKR (TOTAL KNEE REPLACEMENT) USING CEMENT, LEFT: Primary | ICD-10-CM

## 2017-12-08 PROCEDURE — 1111F DSCHRG MED/CURRENT MED MERGE: CPT | Performed by: PHARMACIST

## 2017-12-08 NOTE — TELEPHONE ENCOUNTER
tablet 1    nabumetone (RELAFEN) 750 MG tablet    *pt/Jo asking about this one vs Aleve stopped Take 1 tablet by mouth 2 times daily 180 tablet 1    cyclobenzaprine (FLEXERIL) 10 MG tablet Take 1 tablet by mouth 2 times daily 180 tablet 1    Misc Natural Products (OSTEO BI-FLEX ADV JOINT SHIELD PO)    *not currently taking, but likely will restart Take by mouth       These are the medications you have told us you were taking at home, STOP taking them after you leave the hospital:  Confirms not using Aleve    Additional Medications:  Denies    Estimated Creatinine Clearance: 115 mL/min (based on SCr of 0.5 mg/dL). Assessment/Plan:  - Medication reconciliation completed. Number of medications reviewed: 9    - Pt is not taking medications as directed by discharging physician. Number of discrepancies: 3. Instructions per discharge list provided except per below documentation. Identified medication discrepancies/issues:   · Category 3 (1):  1. Nabumetone - sts was taking prn prior to procedure, and thought no NSAIDs after procedure  · Advised we typically avoid these post-op, especially while on ASA; Aleve was removed prior to discharge - removed nabumetone from med list for now also  · Category 4 (2):  1. Discussed APAP use and @/<3g/day; to call provider if intolerable pain  2. Updated: azithromycin    - Identified Potential Medication Interactions: No clinically significant interactions identified via InfrafoneicoKaliki Interaction Analysis as category D or higher. (aware to monitor CNS depression)    - Renal Dosing: No renal adjustments necessary.    - Follow up appointment date (7 days for more severe illness, 14 days for others):    · Post-op 12/18    No further recommendations at this time; closing encounter without routing.    Thank you,  Tao Sweeney, PharmD, [address]  Direct: [phone]  Department, toll free: [phone], option 7

## 2017-12-08 NOTE — CARE COORDINATION
[facility] Transitions Initial Follow Up Call    Call within 2 business days of discharge: Yes    Patient: Kalyn Aguilar Patient : 1960   MRN: [id number]  Reason for Admission: TKA   Discharge Date: 17 RARS: [facility] Risk Score: 2.5     Spoke with: Yoli: Matilde    Non-face-to-face services provided:  Scheduled appointment with [employer] and reviewed discharge summary and/or continuity of care documents  Education of patient/family/caregiver/guardian to support self-management-review care    Care Transitions 24 Hour Call    Do you have any ongoing symptoms?:  No  Do you have a copy of your discharge instructions?:  Yes  Do you have all of your prescriptions and are they filled?:  Yes  Have you been contacted by a [address]?:  Yes  Have you scheduled your follow up appointment?:  Yes  How are you going to get to your appointment?:  Car - family or friend to transport  Were you discharged with any Home Care or Post Acute Services:  Yes  Post Acute Services:  Home Health (Comment: Arielle Martin)  Do you feel like you have everything you need to keep you well at home?:  Yes  Care Transitions Interventions     Care Transition f/u call post hospital d/ce for TKA. Pt states she is doing good. LTKA incisional pain 1-2. States not bad. Pharmacy has called and reviewed her meds. She has promethazine ordered for nausea but hasn't needed to take it. Pt is taking ASA as ordered. She has not had a BM yet but states not uncomfortable. Suggest taking a stool softener while on the pain medication and she expressed understanding. Home care with pt at time of call and she said the nurse just changed the gauze dressing. Pt has f/u appt scheduled with ortho as noted.      Follow Up  Future Appointments  Date Time Provider Corrine Wan   2017 2:20 PM [facility] [address] Dordeepa Lopez    Care Transition

## 2017-12-12 ENCOUNTER — TELEPHONE (OUTPATIENT)
Dept: ORTHOPEDIC SURGERY | Age: 57
End: 2017-12-12

## 2017-12-12 RX ORDER — OXYCODONE HYDROCHLORIDE AND ACETAMINOPHEN 5; 325 MG/1; MG/1
1 TABLET ORAL EVERY 4 HOURS PRN
Qty: 30 TABLET | Refills: 0 | Status: SHIPPED | OUTPATIENT
Start: 2017-12-12 | End: 2017-12-18 | Stop reason: SDUPTHER

## 2017-12-12 NOTE — TELEPHONE ENCOUNTER
LVM stating that patient does have a prescription ready for pickup today the [address] [facility] office. Instructed her to call the office back.

## 2017-12-13 ENCOUNTER — CARE COORDINATION (OUTPATIENT)
Dept: CASE MANAGEMENT | Age: 57
End: 2017-12-13

## 2017-12-18 ENCOUNTER — HOSPITAL ENCOUNTER (OUTPATIENT)
Dept: GENERAL RADIOLOGY | Facility: MEDICAL CENTER | Age: 57
Discharge: HOME | End: 2017-12-18
Attending: ORTHOPAEDIC SURGERY | Admitting: ORTHOPAEDIC SURGERY

## 2017-12-18 ENCOUNTER — OFFICE VISIT (OUTPATIENT)
Dept: ORTHOPEDIC SURGERY | Age: 57
End: 2017-12-18

## 2017-12-18 VITALS
HEIGHT: 62 IN | WEIGHT: 150 LBS | BODY MASS INDEX: 27.6 KG/M2 | SYSTOLIC BLOOD PRESSURE: 130 MMHG | HEART RATE: 93 BPM | DIASTOLIC BLOOD PRESSURE: 80 MMHG

## 2017-12-18 DIAGNOSIS — M25.562 CHRONIC PAIN OF LEFT KNEE: ICD-10-CM

## 2017-12-18 DIAGNOSIS — Z96.652 S/P TKR (TOTAL KNEE REPLACEMENT) USING CEMENT, LEFT: Primary | ICD-10-CM

## 2017-12-18 DIAGNOSIS — G89.29 CHRONIC PAIN OF LEFT KNEE: ICD-10-CM

## 2017-12-18 PROCEDURE — 73562 X-RAY EXAM OF KNEE 3: CPT | Performed by: PHYSICIAN ASSISTANT

## 2017-12-18 PROCEDURE — 99024 POSTOP FOLLOW-UP VISIT: CPT | Performed by: PHYSICIAN ASSISTANT

## 2017-12-18 RX ORDER — MELOXICAM 15 MG/1
15 TABLET ORAL DAILY
Qty: 30 TABLET | Refills: 2 | Status: SHIPPED | OUTPATIENT
Start: 2017-12-18 | End: 2019-03-22

## 2017-12-18 RX ORDER — OXYCODONE HYDROCHLORIDE AND ACETAMINOPHEN 5; 325 MG/1; MG/1
1 TABLET ORAL EVERY 4 HOURS PRN
Qty: 30 TABLET | Refills: 0 | Status: SHIPPED | OUTPATIENT
Start: 2017-12-18 | End: 2017-12-21 | Stop reason: SDUPTHER

## 2017-12-18 NOTE — PATIENT INSTRUCTIONS
be use as directed unless you request a printed prescription. If you have any concerns, questions or require refills, please contact our office ([phone], Option 3). If you experience any adverse reactions, stop the medication and call our office immediately. Aspirin Instructions: The aspirin (325 mg by mouth twice a day) Dr Marta Mendez asked you to take is used to prevent blood clots. The length of time you will need to take aspirin is for 6 weeks post-op if you are not on any other type of blood thinner (plavix, coumadin, etc). Since aspirin does prevent clotting, you may be prone to excessive bleeding. If you experience signs and symptoms of bleeding and clotting problems, you must seek emergent care. You need to inform other health-care providers (ie family physicians, dentists, etc) that you are taking aspirin, especially if any procedures are planned. Call the office ([phone], Option 3) if you have any questions regarding your aspirin therapy. Antibiotics after Total Knee Replacements:     [x] Prophylactic antibiotics before routine dental cleaning are not required. [x] Other surgeries/procedures do require antibiotics (colonoscopy, abcesses/ active infections, etc.)    If you are NOT allergic to penicillin: 2 grams of amoxicillin, cephalexin or cephradine (orally) OR 2 grams of ampicillin or 1 gram of cefazolin (intra-muscularly or intravenously) 1 hour before the procedure. If you ARE allergic to penicillin[de-identified] 600 milligrams of clindamycin (orally or intravenously) 1 hour before the procedure. PLEASE NOTE: Antibiotics may vary based on clinical situation and culture results. PATIENT REMINDER:   Carry a list of your medications and allergies with you at all times. Call your pharmacy and our office at least 1 week in advance to refill prescriptions. Narcotic medications will not be refilled after hours or on weekends.          ATTENTION    As of October 2, 2014, the [place] Drug Enforcement Agency (TAM[employer]) has mandated that ALL PRESCRIPTION PAIN MEDICINE cannot be called into your pharmacy. This includes:    Oxycodone (Percocet)  Hydrocodone (Vicodin, Norco)  Tramadol (Ultram)  Other    These medications must have prescriptions which are written and signed by your provider. This means that you must call ahead and come in to the office to  the paper prescription and take it to your pharmacy. We are sorry for any inconvenience but this is now the law. Karma Humphries PA-C  Physician Assistant, Orthopaedic Surgery    Contact Information:  Simone Condon,  & Clinical Staff  [phone], Option 3         IMPORTANT NARCOTIC INFORMATION: PLEASE READ     [x] DO NOT share your prescription medication with anyone! Sharing is illegal.  The prescription dose is based on your age, weight, and health problems. Sharing your narcotic prescription can lead to accidental death of the individual for which the prescription was not prescribed. You may not know about his/her addiction problem. [x] Always use the same pharmacy when filling your narcotic prescriptions. [x] DO NOT mix your narcotic prescription with alcohol. Mixing the narcotic prescription medication with alcohol causes depressive effects including breathing problems, organ malfunction, and cardiac arrest.     [x] Always keep your narcotic medication in a locked, secured location. Keep your medication private. This is to avoid individuals from taking your medication without your knowledge. This medication is highly sought after and locking your prescriptions will protect you from being a target of crime. [x] DO NOT stock pile your medication. This also will protect you from being a target of crime. [x] Dispose of any unused medications properly. Do not flush the medications. Look for appropriate waste collection programs in your community and drug take back events.      [x] DO NOT drive while taking narcotic pain medication or muscle relaxers. REMINDER:  The phases following a total knee replacement that most patients often encounter. Briefly:    First phase: First phase is The Pain Phase. There is plenty of pain medication (and pharmacy can be consulted if needed). TKR is a painful operation however pain management has improved significantly. Second phase: The second phase is Not Sleeping Phase. It is common for total knee replacement patients not to sleep well after total knee replacement. This can go on for several months. Third phase: The third phase is Depression. The \"not sleeping at night phase\" leads to the third phase in which patients often experience depression/the blues, feeling that \"this will never get any better\". It will get better. Fourth phase: The fourth phase is Swelling and Warmth. Around 4-6 weeks, patients will start to compare their total knee replacement knee with their other knee. They note that the total knee replacement knee is still a bit more swollen and a bit more warm compared to the other knee. This is normal.    Fifth phase: The fifth phase is \"my knee makes noises\" phase. It is common for patients to note that their total knee replacement makes noises. FALL PREVENTION:  SIMPLE TIPS    Vitamin D3:  Over-the-counter supplement of Vitamin D3, (at least 2,000 IU daily). Vitamin D3 is widely available without a prescription at pharmacies and buying clubs ([employer], [facility]) and on-line at sites such as Amazon.com. It comes in a variety of formulations (tablets, gelcaps, liquid) and doses (1,000 IU, 2,000 IU, 4,000 IU, 5,000 IU and even higher). The right dose for most people is 2,000 IU per day but higher doses are sometimes needed. You can take your vitamin D3 at any time of the day, with or without food, with or without calcium.   Because vitamin D is long acting, if you miss your vitamin D on one day you can double up the dose on a later day. Exercise: Low impact exercise programs such as Roe Chi. Chair Raise Exercise. Yoga. Adult fitness classes at the  or community center. Physical Therapy: Formal physical therapy program to strengthen your lower extremities, improve your balance and gait. Home Assessment: Remove clutter and tripping hazards: loose/throw rugs, cords or cables. Install or placement of railings, grab bars around steps/stairs and in the bathroom (toilet, bath tub, sink). Improve lighting. Foot Wear:  Stable, well fitting. Avoid loose straps or ties, slippery soles. Vision/Eye Check Up: Have your vision checked yearly. Resources:  www.cdc.gov/injury/STEADI    1). STEADI: Stay Independent Self Risk Assessment    2). CDC Handouts:  How to prevent falls, Home Safety Fall Prevention         If you or someone you know struggles with weight issues and joint pain, please check out this important documentary:      \"Start Moving Start Living\" =  Http://[employer]       (YOUTUBE video SMSL FULL SD)

## 2017-12-18 NOTE — PROGRESS NOTES
Jaycee Tello PA-C  Orthopaedic Surgery & Sports Medicine       [x] [address] [facility]   [] [facility]  [address]  [address] [address], [address] [address] [address]    [phone] Option 3   [phone] Option [phone] (fax)    [phone] (fax)       PATIENT: Venu James    56 y.o.  female  [facility]: 1960   MRN:  [id number]       Date of current encounter: 12/18/2017  This encounter is evaluated as a:       [] New Patient Visit    [] Established Patient Visit   [x] Post-Op Visit     [] Consult: requested by         [] Worker's Comp       Patient's PCP is Dr. Jessica Rae MD      SURGICAL FINDINGS: status post total left knee replacement using cement 12/6/17       Subjective:     Chief Complaint   Patient presents with    Knee Pain     ongoing evaluation and treatment of left knee        HPI:  s/p Left TKR Surgery on 12/6/2017. Venu James returns to the office today in follow-up of her left TKR. She denies fever, wound drainage, increasing redness, pus, increasing pain, increasing swelling and there are no other reported complications. She is doing her physical therapy exercises. She does plan on starting outpatient physical therapy next week. She rates her pain as a 1-2 out of 10 at rest and a 4 out of 10 with activity. She describes her pain as dull and aching. It occurs for minutes and is intermittent. Bending, stretching, straightening, exercise aggravate her pain. Rest, ice and Tylenol help to relieve her pain. She does have some night pain. She does have some morning stiffness. Regarding Aspirin Therapy and VTE Precautions:  Venu James reports she is  taking her aspirin. She denies abnormal bruising or abnormal bleeding from any body orifice such as bleeding from nose or gums, blood in urine or stool, or melena, hemoptysis or hematemesis. She is wearing her OMAR hose stockings.     PAIN ASSESSMENT:   Pain Assessment  Location of Pain: Knee  Location Modifiers: Left  Severity of Pain: 4  Quality of Pain: Dull, Aching  Duration of Pain: A few minutes  Frequency of Pain: Intermittent  Date Pain First Started:  (from sx)  Aggravating Factors: Bending, Stretching, Straightening, Exercise  Limiting Behavior: Yes  Relieving Factors: Rest, Ice, Other (Comment) (tylenol)  Result of Injury: Yes  Work-Related Injury: No  Are there other pain locations you wish to document?: No    Patient Active Problem List   Diagnosis    Hypothyroidism    Chronic pain of left knee    [place] in [place]    S/P right knee surgery 1977    Bipartite patella left knee    Acquired varus deformity knee, left    Post-traumatic osteoarthritis of right knee    Acquired genu valgum of right knee    Osteochondroma of right tibia    S/P TKR (total knee replacement) using cement, left on 12/6/2017     Past Medical History:   Diagnosis Date    Arthritis     Hypothyroidism      Past Surgical History:   Procedure Laterality Date    KNEE SURGERY Left 1976    by Dr. Ade Hernadez in [facility] [address]    by Dr. Ade Hernadez in [place]    OTHER SURGICAL HISTORY Left 12/06/2017    LEFT TOTAL KNEE ARTHROPLASTY                 Allergies:  Review of patient's allergies indicates no known allergies. Medications:  Prior to Visit Medications    Medication Sig Taking? Authorizing Provider   oxyCODONE-acetaminophen (PERCOCET) 5-325 MG per tablet Take 1 tablet by mouth every 4 hours as needed for Pain . Earliest Fill Date: 12/12/17  Angel Nichole MD   aspirin 325 MG EC tablet Take 1 tablet by mouth 2 times daily (with meals)  MITRA Lee   levothyroxine (SYNTHROID) 112 MCG tablet Take 1 tablet by mouth daily  Marlon Romeo MD     Social History     Social History    Marital status: Life Partner     Spouse name: N/A    Number of children: N/A    Years of education: N/A     Occupational History    Not on file.      Social History Main Topics    Smoking status: Former Smoker    Smokeless tobacco: Never Used      Comment: quit 2010    Alcohol use Yes      Comment: rarely    Drug use: No    Sexual activity: Not on file     Other Topics Concern    Not on file     Social History Narrative    No narrative on file     History reviewed. No pertinent family history. Review of Systems (ROS):    [x]Performed. Marcus Yanez's review of systems has been performed by intake and observation. The most recent ROS was scanned into the media tab of the chart today, 12/18/2017. She has been instructed to contact her primary care physician regarding ROS issues if not already being addressed at this time. There are no recent changes. Objective:   Physical Exam  Vital Signs:  /80   Pulse 93   Ht 5' 2\" (1.575 m)   Wt 150 lb (68 kg)   BMI 27.44 kg/m²     Constitution:  Generally, Demetrius Nunez is [x] alert, [x] appears stated age, and [x] in no distress. Her general body habitus is [] Cachectic  [] Thin  [x] Normal  [] Obese  [] Morbidly Obese    Head: [x] Normocephalic  Eyes: [x] Extra-occular muscles intact   [x]  Wears glasses  Left Ear: [x] External Ear normal   Right Ear: [x] External Ear normal   Nose: [x] Normal  Mouth: [x] Oral mucosa moist  [x] No perioral lesions    Pulmonary: [x] Respirations unlabored and regular  Skin: [x] Warm [x] Well perfused     Psychiatric:   [x] Good judgement and insight  [x] Oriented to [x] person, [x] place, and [x] time. [x] Mood appropriate for circumstances. Gait:   Gait is [] Normal  [x] Impaired Normal post-op  Assistive Device: [] None  [] Knee Brace  [] Ladena Donate  [x] Crutches   [] Star Sonny   [] Wheelchair  [] Other    ORTHOPAEDIC KNEE EXAM: LEFT   Inspection:  [x] Skin intact without abrasion or lacerations.   [x] Incisions with no signs of infection (red, warmth, drainage) and healing well  [] Incisions well healed  [x] Ecchymosis:  [x] none  [] mild  [] moderate  [] severe  [x] Atrophy:  [x] none adequately to ongoing treatment after surgery    []  Worsening despite postoperative treatment     []  Unchanged despite postoperative treatment     Plan (Medical Decision Making):       [x] Continue deep venous thrombosis prophylaxis:    OMAR Bolaños    Aspirin 325 mg PO BID x 6 weeks post-op. Once she has finished her aspirin she may start meloxicam. We did discuss her not taking any other anti-inflammatories while taking the meloxicam.   [x] Continue physical therapy   [x] Continue pain control: Ice and Percocet   [x] Ice to knee. [x] The staples were removed today. Steri-strips were applied. [x] Anjali Bruno has been instructed to remove the steri-strips in two days then she can start to rub Vit E oil on the incision(s) once a day. Refills/scripts given today: Meloxicam and Percocet        [x]  OARRS ([place] Automated Rx Reporting System):                               OARRS Report on Anjlai Brnuo was run: 11/13/17. Active Cumulative Morphine Equivalent (No patient found). OARRS report scanned into media. Patient being given increased dosage/quantity of opioid pain medication in excess of OSMB guidelines which noted a 30 MED of opioids due to the fact that she has undergone major orthopaedic surgery as outlined in rule 4731-11-13. Dosages and further duration of the pain medication will be re-evaluated at her post op visit. Patient was educated on dosing expectations and limits of prescribing as a result of the new [facility] Board rules enacted August 31, 2017. Please also note that this is not the initial opioid prescription issued to this patient but a continuation of medication utilized during the hospital admission as noted in the medical record. OARRS report has also been utilized to screen for any abuse history or suspicious activity as outlined in [place].   All efforts have been taken to prevent abuse potential and misuse of opioid medications. Return to Clinic/Follow - Up:  Tarah Grubbs was asked to make a follow-up appointment:    [x] 4-6 weeks. Tarah Grubbs was instructed to call the office if her symptoms worsen or if new symptoms appear prior to the next scheduled visit. She is specifically instructed to contact the office between now & her scheduled appointment if she has concerns related to her condition. She is welcome to call for an appointment sooner if she has any additional concerns or questions. Patient Instructions     PATIENT INSTRUCTIONS  For Tarah Grubbs  Office Visit with Jun Martinez PA-C on 12/18/2017    Activity & PT Instructions:   Knee immobilizer and OMAR hose to be used until instructed by MD/PT   Avoid painful activities   No sports until cleared by MD/PT   No driving if you are taking narcotic pain medications or muscle relaxers   Keep extremity elevated   PT or Home Exercise Program for 3 months post-op    Note: PT is usually twice a week for 3 months (will be extended if necessary)   \"Remember to: [employer] and [employer]" your knee as instructed by Dr Marta Mendez    Weight Bearing:   Use a walker until instructed by MD/PT   Use crutches or a walker if your knee is painful   May be partial weightbearing on operative leg   Advance to full weightbearing as tolerated once cleared by MD/PT    Wound Instructions:   Once sutures/staples are removed, use Vit E oil on incisions once a day   If in the sun, protect incisions from sun by using:     Sunscreen 30-50 SPF, reapply regularly. Elevate operative limb above heart level   Ice to knee for 15  20 minutes 3 x day    (\"ICE IS YOUR FRIEND\": try using a bag of frozen peas or corn)      Call the office ([phone], Option 3) if:     The incision becomes red, swollen or develops drainage. You develop a fever greater than 101 degrees. Follow-up Care:   I have asked Tarah Grubbs to schedule a follow-up appointment in 4 to 6 weeks.     She is specifically location. Keep your medication private. This is to avoid individuals from taking your medication without your knowledge. This medication is highly sought after and locking your prescriptions will protect you from being a target of crime. [x] DO NOT stock pile your medication. This also will protect you from being a target of crime. [x] Dispose of any unused medications properly. Do not flush the medications. Look for appropriate waste collection programs in your community and drug take back events. [x] DO NOT drive while taking narcotic pain medication or muscle relaxers. REMINDER:  The phases following a total knee replacement that most patients often encounter. Briefly:    First phase: First phase is The Pain Phase. There is plenty of pain medication (and pharmacy can be consulted if needed). TKR is a painful operation however pain management has improved significantly. Second phase: The second phase is Not Sleeping Phase. It is common for total knee replacement patients not to sleep well after total knee replacement. This can go on for several months. Third phase: The third phase is Depression. The \"not sleeping at night phase\" leads to the third phase in which patients often experience depression/the blues, feeling that \"this will never get any better\". It will get better. Fourth phase: The fourth phase is Swelling and Warmth. Around 4-6 weeks, patients will start to compare their total knee replacement knee with their other knee. They note that the total knee replacement knee is still a bit more swollen and a bit more warm compared to the other knee. This is normal.    Fifth phase: The fifth phase is \"my knee makes noises\" phase. It is common for patients to note that their total knee replacement makes noises. FALL PREVENTION:  SIMPLE TIPS    Vitamin D3:  Over-the-counter supplement of Vitamin D3, (at least 2,000 IU daily).   Vitamin D3 is

## 2017-12-27 ENCOUNTER — TELEPHONE (OUTPATIENT)
Dept: ORTHOPEDIC SURGERY | Age: 57
End: 2017-12-27

## 2017-12-29 ENCOUNTER — HOSPITAL ENCOUNTER (OUTPATIENT)
Dept: PHYSICAL THERAPY | Age: 57
Discharge: HOME | End: 2017-12-31
Admitting: ORTHOPAEDIC SURGERY

## 2017-12-29 NOTE — PLAN OF CARE
[address], [address], [address]  Phone: [phone]   Fax: [phone]                                                       Physical Therapy Certification    Dear Referring Practitioner: Dr. Edilberto Craven,    We had the pleasure of evaluating the following patient for physical therapy services at [address] [place] [address]. A summary of our findings can be found in the initial assessment below. This includes our plan of care. If you have any questions or concerns regarding these findings, please do not hesitate to contact me at the office phone number checked above. Thank you for the referral.       Physician Signature:_______________________________Date:__________________  By signing above (or electronic signature), therapists plan is approved by physician      Patient: Genaro Gregg   : 1960   MRN: [id number]  Referring Physician: Referring Practitioner: Dr. Edilberto Craven      Evaluation Date: 2017      Medical Diagnosis Information:  Diagnosis: s/p L TKA 17   Treatment Diagnosis: R26.2 - difficulty walking; M25.562 - L knee pain                                         Insurance information: PT Insurance Information: [employer] - medical necessity, no auth     Precautions/ Contra-indications: n/a  Latex Allergy:  [x]NO      []YES  Preferred Language for Healthcare:   [x]English       []other:    SUBJECTIVE: Patient stated complaint: L knee since accident in  - has had constant pain since then that progressively worsened. PMME in  - per pt, this is what started arthritis. Pain worsened to the point that she had L TKA 17. Has had home PT since then - pt has been discharged. ROM = 0 - 105 (with overpressure for flexion). Pt has been going without cane most of the time (per recommendation of home PT).    Medication: pathologies   [x]Prior surgical intervention - 2 prior knee surgeries (1 on each leg)  []Osteoporosis (M81.8)  []Osteopenia (M85.8)   Endocrine conditions   []Hypothyroid (E03.9)  []Hyperthyroid Gastrointestinal conditions   []Constipation (L41.69)   Metabolic conditions   []Morbid obesity (E66.01)  []Diabetes type 1(E10.65) or 2 (E11.65)   []Neuropathy (G60.9)     Pulmonary conditions   []Asthma (J45)  []Coughing   []COPD (J44.9)   Psychological Disorders  []Anxiety (F41.9)  []Depression (F32.9)   []Other:   []Other:          Barriers to/and or personal factors that will affect rehab potential:              []Age  []Sex    []Smoker              []Motivation/Lack of Motivation                        []Co-Morbidities              []Cognitive Function, education/learning barriers              []Environmental, home barriers              []profession/work barriers  [x]past PT/medical experience - 2 prior knee surgeries  []other:  Justification:     Falls Risk Assessment (30 days):   [x] Falls Risk assessed and no intervention required. [] Falls Risk assessed and Patient requires intervention due to being higher risk   TUG score (>12s at risk):     [] Falls education provided, including       G-Codes:  PT G-Codes  Functional Assessment Tool Used: LEFS  Score: 32/80 = 60% LOF  Functional Limitation: Mobility: Walking and moving around  Mobility: Walking and Moving Around Current Status (): At least 60 percent but less than 80 percent impaired, limited or restricted  Mobility: Walking and Moving Around Goal Status ():  At least 1 percent but less than 20 percent impaired, limited or restricted    ASSESSMENT:   Functional Impairments:     [x]Noted lumbar/proximal hip/LE joint hypomobility   [x]Decreased LE functional ROM   [x]Decreased core/proximal hip strength and neuromuscular control   [x]Decreased LE functional strength   [x]Reduced balance/proprioceptive control   []other:      Functional Activity Limitations (from functional questionnaire and intake)   [x]Reduced ability to tolerate prolonged functional positions   [x]Reduced ability or difficulty with changes of positions or transfers between positions   [x]Reduced ability to maintain good posture and demonstrate good body mechanics with sitting, bending, and lifting   [x]Reduced ability to sleep   [x] Reduced ability or tolerance with driving and/or computer work   [x]Reduced ability to perform lifting, carrying tasks   [x]Reduced ability to squat   []Reduced ability to forward bend   [x]Reduced ability to ambulate prolonged functional periods/distances/surfaces   [x]Reduced ability to ascend/descend stairs   [x]Reduced ability to run, hop, cut or jump   []other:    Participation Restrictions   [x]Reduced participation in self care activities   [x]Reduced participation in home management activities   []Reduced participation in work activities   [x]Reduced participation in social activities. [x]Reduced participation in sport/recreation activities. Classification :    [x]Signs/symptoms consistent with post-surgical status including decreased ROM, strength and function.    []Signs/symptoms consistent with joint sprain/strain   []Signs/symptoms consistent with patella-femoral syndrome   []Signs/symptoms consistent with knee OA/hip OA   []Signs/symptoms consistent with internal derangement of knee/Hip   []Signs/symptoms consistent with functional hip weakness/NMR control      []Signs/symptoms consistent with tendinitis/tendinosis    []signs/symptoms consistent with pathology which may benefit from Dry needling      []other:      Prognosis/Rehab Potential:      []Excellent   [x]Good    []Fair   []Poor    Tolerance of evaluation/treatment:    []Excellent   [x]Good    []Fair   []Poor    Physical Therapy Evaluation Complexity Justification  [x] A history of present problem with:  [] no personal factors and/or comorbidities that impact the plan of care;  [x]1-2 personal factors and/or comorbidities that impact the plan of care  []3 personal factors and/or comorbidities that impact the plan of care  [x] An examination of body systems using standardized tests and measures addressing any of the following: body structures and functions (impairments), activity limitations, and/or participation restrictions;:  [] a total of 1-2 or more elements   [] a total of 3 or more elements   [x] a total of 4 or more elements   [x] A clinical presentation with:  [x] stable and/or uncomplicated characteristics   [] evolving clinical presentation with changing characteristics  [] unstable and unpredictable characteristics;   [x] Clinical decision making of [x] low, [] moderate, [] high complexity using standardized patient assessment instrument and/or measurable assessment of functional outcome. [x] EVAL (LOW) 63529 (typically 30 minutes face-to-face)  [] EVAL (MOD) 74424 (typically 30 minutes face-to-face)  [] EVAL (HIGH) 16585 (typically 45 minutes face-to-face)  [] RE-EVAL     PLAN:   Frequency/Duration:  2 days per week for 8 Weeks:  Interventions:  [x]  Therapeutic exercise including: strength training, ROM, for Lower extremity and core   [x]  NMR activation and proprioception for LE, Glutes and Core   [x]  Manual therapy as indicated for LE, Hip and spine to include: Dry Needling/IASTM, STM, PROM, Gr I-IV mobilizations, manipulation. [x] Modalities as needed that may include: thermal agents, E-stim, Biofeedback, US, iontophoresis as indicated  [x] Patient education on joint protection, postural re-education, activity modification, progression of HEP. HEP instruction: Pt. Provided with written and illustrated instructions for home program. Pt to perform exercises 2x day f/b ice 15 min. 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 Independent in HEP and progression per patient tolerance, in order to prevent re-injury. 2. Patient will have a decrease in pain to facilitate improvement in movement, function, and ADLs as indicated by Functional Deficits. Long Term Goals: To be achieved in: 8 weeks  1. Disability index score of 10% or less for the LEFS to assist with reaching prior level of function. 2. Patient will demonstrate increased AROM to at least 0 - 130 to allow for proper joint functioning as indicated by patients Functional Deficits. 3. Patient will demonstrate an increase in Strength to at least 5/5 as well as good proximal hip strength and control to allow for proper functional mobility as indicated by patients Functional Deficits. 4. Patient will return to functional activities including walking community distances without increased symptoms or restriction. 5. Patient will return to functional activities including ascending and descending 1 flight of stairs without increased symptoms or restriction. 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 7. Patient will return to horseback riding without increased symptoms or restriction in 3-4 months.       Electronically signed by:  Lyle James PT

## 2017-12-29 NOTE — FLOWSHEET NOTE
demonstrate an increase in Strength to at least 5/5 as well as good proximal hip strength and control to allow for proper functional mobility as indicated by patients Functional Deficits. 4. Patient will return to functional activities including walking community distances without increased symptoms or restriction. 5. Patient will return to functional activities including ascending and descending 1 flight of stairs without increased symptoms or restriction. 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 7. Patient will return to horseback riding without increased symptoms or restriction in 3-4 months. Progression Towards Functional goals:  [] Patient is progressing as expected towards functional goals listed. [] Progression is slowed due to complexities listed. [] Progression has been slowed due to co-morbidities.   [x] Plan just implemented, too soon to assess goals progression  [] Other:     Persisting Functional Limitations/Impairments:  [x]Sitting [x]Standing   [x]Walking [x]Squatting/bending    [x]Stairs [x]ADL's    [x]Transfers []Reaching  [x]Housework []Job related tasks  [x]Driving [x]Sports/Recreation   []Other:    ASSESSMENT:  See eval  Treatment/Activity Tolerance:  [x] Patient tolerated treatment well [] Patient limited by fatique  [] Patient limited by pain  [] Patient limited by other medical complications  [] Other:     Prognosis: [x] Good [] Fair  [] Poor    Patient Requires Follow-up: [x] Yes  [] No    Return to Play:    [x]  N/A   []  Stage 1: Intro to Strength   []  Stage 2: Return to Run and Strength   []  Stage 3: Return to Jump and Strength   []  Stage 4: Dynamic Strength and Agility   []  Stage 5: Sport Specific Training     []  Ready to Return to Play, Meets All Above Stages   []  Not Ready for Return to Sports   Comments:            PLAN: See eval; PT 2x / wk  [] Continue per plan of care [] Soraya Camp current plan (see comments)  [x] Plan of care initiated [] Hold pending MD visit [] Discharge    Electronically signed by: Claudetta Peals PT, DPT

## 2018-01-01 ENCOUNTER — HOSPITAL ENCOUNTER (OUTPATIENT)
Dept: PHYSICAL THERAPY | Age: 58
Discharge: HOME | End: 2018-01-31
Attending: ORTHOPAEDIC SURGERY | Admitting: ORTHOPAEDIC SURGERY

## 2018-01-02 DIAGNOSIS — Z96.652 S/P TKR (TOTAL KNEE REPLACEMENT) USING CEMENT, LEFT: ICD-10-CM

## 2018-01-02 RX ORDER — OXYCODONE HYDROCHLORIDE AND ACETAMINOPHEN 5; 325 MG/1; MG/1
1 TABLET ORAL EVERY 4 HOURS PRN
Qty: 30 TABLET | Refills: 0 | Status: SHIPPED | OUTPATIENT
Start: 2018-01-02 | End: 2018-01-07

## 2018-01-02 RX ORDER — ASPIRIN 325 MG
325 TABLET, DELAYED RELEASE (ENTERIC COATED) ORAL 2 TIMES DAILY WITH MEALS
Qty: 28 TABLET | Refills: 0 | Status: SHIPPED | OUTPATIENT
Start: 2018-01-02 | End: 2018-03-22

## 2018-01-02 NOTE — TELEPHONE ENCOUNTER
Okay to fill. OARRS ([facility] Automated Rx Reporting System):  OARRS Report on Almita Denis run: 11/13/17.  Guy Nissen Cumulative Morphine Equivalent (No patient found). OARRS report scanned into media. Patient being given increased dosage/quantity of opioid pain medication in excess of [facility] guidelines which noted a 30 MED of opioids due to the fact that she has undergone major orthopaedic surgery as outlined in rule 4731-11-13. Dosages and further duration of the pain medication will be re-evaluated at her post op visit. Patient was educated on dosing expectations and limits of prescribing as a result of the new [facility] rules enacted August 31, 2017. Please also note that this is not the initial opioid prescription issued to this patient but a continuation of medication utilized during the hospital admission as noted in the medical record. OARRS report has also been utilized to screen for any abuse history or suspicious activity as outlined in [place]. All efforts have been taken to prevent abuse potential and misuse of opioid medications.

## 2018-01-04 ENCOUNTER — TELEPHONE (OUTPATIENT)
Dept: ORTHOPEDIC SURGERY | Age: 58
End: 2018-01-04

## 2018-01-04 DIAGNOSIS — M25.562 CHRONIC PAIN OF LEFT KNEE: ICD-10-CM

## 2018-01-04 DIAGNOSIS — G89.29 CHRONIC PAIN OF LEFT KNEE: ICD-10-CM

## 2018-01-04 DIAGNOSIS — Z96.652 S/P TKR (TOTAL KNEE REPLACEMENT) USING CEMENT, LEFT: Primary | ICD-10-CM

## 2018-01-04 RX ORDER — METHYLPREDNISOLONE 4 MG/1
TABLET ORAL
Qty: 1 KIT | Refills: 0 | Status: SHIPPED | OUTPATIENT
Start: 2018-01-04 | End: 2018-01-15

## 2018-01-04 NOTE — TELEPHONE ENCOUNTER
Per Dr. Myrna Maldonado, e-prescribed Medrol Dose Pac to patient pharmacy on file. Called patient and left message with instructions to take as directed. While she is on the pac she is to stop the Mobic. Once the pac is completed, she can go back to taking the Mobic. Instructed patient as well in voicemail to call the office with any further questions.

## 2018-01-04 NOTE — TELEPHONE ENCOUNTER
Please call in:      [x] Medrol Dose Pac  Sig: take as directed   Disp: one pac   Refills: 0 (none)      Thank you.   Karyle Captain MD

## 2018-01-05 ENCOUNTER — HOSPITAL ENCOUNTER (OUTPATIENT)
Dept: PHYSICAL THERAPY | Age: 58
Discharge: HOME | End: 2018-01-05
Admitting: ORTHOPAEDIC SURGERY

## 2018-01-05 NOTE — FLOWSHEET NOTE
Neuromuscular Re-ed / Therapeutic Activities       Quad sets Into towel roll 10\" 10    Gait training w/ cups  5 cups 8  Focus on hip and knee flexion for foot clearance. Biodex balance PS L10 4'            Manual Intervention       Knee mobs/PROM       Tib/Fem Mobs       Patella Mobs       Ankle mobs                       Patient Education:  Updated written HEP instructions (see scanned image in ). Therapeutic Exercise and NMR EXR  [x] (80815) Provided verbal/tactile cueing for activities related to strengthening, flexibility, endurance, ROM for improvements in LE, proximal hip, and core control with self care, mobility, lifting, ambulation. [x] (65166) Provided verbal/tactile cueing for activities related to improving balance, coordination, kinesthetic sense, posture, motor skill, proprioception  to assist with LE, proximal hip, and core control in self care, mobility, lifting, ambulation and eccentric single leg control.      NMR and Therapeutic Activities:    [] (06287 or 09185) Provided verbal/tactile cueing for activities related to improving balance, coordination, kinesthetic sense, posture, motor skill, proprioception and motor activation to allow for proper function of core, proximal hip and LE with self care and ADLs  [] (75330) Gait Re-education- Provided training and instruction to the patient for proper LE, core and proximal hip recruitment and positioning and eccentric body weight control with ambulation re-education including up and down stairs     Home Exercise Program:    [x] (26231) Reviewed/Progressed HEP activities related to strengthening, flexibility, endurance, ROM of core, proximal hip and LE for functional self-care, mobility, lifting and ambulation/stair navigation   [] (06615)Reviewed/Progressed HEP activities related to improving balance, coordination, kinesthetic sense, posture, motor skill, proprioception of core, proximal hip and LE for self care, mobility,

## 2018-01-09 ENCOUNTER — HOSPITAL ENCOUNTER (OUTPATIENT)
Dept: PHYSICAL THERAPY | Age: 58
Discharge: HOME | End: 2018-01-09
Admitting: ORTHOPAEDIC SURGERY

## 2018-01-09 NOTE — FLOWSHEET NOTE
manual therapy to mobilize LE, proximal hip and/or LS spine soft tissue/joints for the purpose of modulating pain, promoting relaxation,  increasing ROM, reducing/eliminating soft tissue swelling/inflammation/restriction, improving soft tissue extensibility and allowing for proper ROM for normal function with self care, mobility, lifting and ambulation. Modalities:  [] (63434) Vasopneumatic compression: Utilized vasopneumatic compression to decrease edema / swelling for the purpose of improving mobility and quad tone / recruitment which will allow for increased overall function including but not limited to self-care, transfers, ambulation, and ascending / descending stairs. 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 Independent in HEP and progression per patient tolerance, in order to prevent re-injury - met 1/9.   2. Patient will have a decrease in pain to facilitate improvement in movement, function, and ADLs as indicated by Functional Deficits - met 1.9.     Long Term Goals: To be achieved in: 8 weeks  1. Disability index score of 10% or less for the LEFS to assist with reaching prior level of function. 2. Patient will demonstrate increased AROM to at least 0 - 130 to allow for proper joint functioning as indicated by patients Functional Deficits.    3. Patient will demonstrate an increase in Strength to at least 5/5 as well as good proximal hip strength and control to allow for proper functional mobility as indicated by patients Functional Deficits. 4. Patient will return to functional activities including walking community distances without increased symptoms or restriction. 5. Patient will return to functional activities including ascending and descending 1 flight of stairs without increased symptoms or restriction. 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 7. Patient will return to horseback riding without increased symptoms or restriction in 3-4 months. Progression Towards Functional goals:  [x] Patient is progressing as expected towards functional goals listed. [] Progression is slowed due to complexities listed. [] Progression has been slowed due to co-morbidities. [] Plan just implemented, too soon to assess goals progression  [] Other:     Persisting Functional Limitations/Impairments:  []Sitting [x]Standing   [x]Walking [x]Squatting/bending    [x]Stairs []ADL's    [x]Transfers []Reaching  [x]Housework []Job related tasks  [x]Driving [x]Sports/Recreation   []Other:    ASSESSMENT:  Pt tolerated tx well. Knee flexion PROM continues to steadily increase. Challenged by progressions, but no c/o increased pain. Pt will continue to benefit from skilled PT for progression of ROM, strength and NM re-ed which will allow for normalization of gait as well as improvement in overall function.    Treatment/Activity Tolerance:  [x] Patient tolerated treatment well [] Patient limited by fatique  [] Patient limited by pain  [] Patient limited by other medical complications  [] Other:     Prognosis: [x] Good [] Fair  [] Poor    Patient Requires Follow-up: [x] Yes  [] No    Return to Play:    [x]  N/A   []  Stage 1: Intro to Strength   []  Stage 2: Return to Run and Strength   []  Stage 3: Return to Jump and Strength   []  Stage 4: Dynamic Strength and Agility   []  Stage 5: Sport Specific Training     []  Ready to Return to Play, Meets All Above Stages   []  Not Ready for Return to Sports   Comments:            PLAN: PT 2x / wk.  NPV - [facility], [facility]  [x] Continue per plan of care [] Alter current plan (see comments)  [] Plan of care initiated [] Hold pending MD visit [] Discharge    Electronically signed by: Leana Meléndez PT, DPT

## 2018-01-10 ENCOUNTER — TELEPHONE (OUTPATIENT)
Dept: ORTHOPEDIC SURGERY | Age: 58
End: 2018-01-10

## 2018-01-11 ENCOUNTER — HOSPITAL ENCOUNTER (OUTPATIENT)
Dept: PHYSICAL THERAPY | Age: 58
Discharge: HOME | End: 2018-01-11
Admitting: ORTHOPAEDIC SURGERY

## 2018-01-11 NOTE — FLOWSHEET NOTE
G-I, II, III, IV (PA's, Inf., Post.)  [] (45991) Provided manual therapy to mobilize LE, proximal hip and/or LS spine soft tissue/joints for the purpose of modulating pain, promoting relaxation,  increasing ROM, reducing/eliminating soft tissue swelling/inflammation/restriction, improving soft tissue extensibility and allowing for proper ROM for normal function with self care, mobility, lifting and ambulation. Modalities:  [] (57847) Vasopneumatic compression: Utilized vasopneumatic compression to decrease edema / swelling for the purpose of improving mobility and quad tone / recruitment which will allow for increased overall function including but not limited to self-care, transfers, ambulation, and ascending / descending stairs. 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 Independent in HEP and progression per patient tolerance, in order to prevent re-injury - met 1/9.   2. Patient will have a decrease in pain to facilitate improvement in movement, function, and ADLs as indicated by Functional Deficits - met 1.9.     Long Term Goals: To be achieved in: 8 weeks  1. Disability index score of 10% or less for the LEFS to assist with reaching prior level of function. 2. Patient will demonstrate increased AROM to at least 0 - 130 to allow for proper joint functioning as indicated by patients Functional Deficits.    3. Patient will demonstrate an increase in Strength to at least 5/5 as well as

## 2018-01-15 ENCOUNTER — OFFICE VISIT (OUTPATIENT)
Dept: ORTHOPEDIC SURGERY | Age: 58
End: 2018-01-15

## 2018-01-15 ENCOUNTER — HOSPITAL ENCOUNTER (OUTPATIENT)
Dept: PHYSICAL THERAPY | Age: 58
Discharge: HOME | End: 2018-01-15
Admitting: ORTHOPAEDIC SURGERY

## 2018-01-15 VITALS
DIASTOLIC BLOOD PRESSURE: 89 MMHG | SYSTOLIC BLOOD PRESSURE: 139 MMHG | WEIGHT: 150 LBS | HEIGHT: 62 IN | BODY MASS INDEX: 27.6 KG/M2

## 2018-01-15 DIAGNOSIS — Z96.652 S/P TKR (TOTAL KNEE REPLACEMENT) USING CEMENT, LEFT: Primary | ICD-10-CM

## 2018-01-15 PROCEDURE — 99024 POSTOP FOLLOW-UP VISIT: CPT | Performed by: PHYSICIAN ASSISTANT

## 2018-01-15 RX ORDER — ACETAMINOPHEN 325 MG/1
650 TABLET ORAL EVERY 6 HOURS PRN
COMMUNITY
End: 2018-03-22

## 2018-01-15 RX ORDER — OXYCODONE HYDROCHLORIDE AND ACETAMINOPHEN 5; 325 MG/1; MG/1
1 TABLET ORAL EVERY 4 HOURS PRN
COMMUNITY
End: 2018-01-18 | Stop reason: SDUPTHER

## 2018-01-15 NOTE — LETTER
FAXED/SENT TO Dr Jose Juan Willard MD  1/15/18, 1:58 PM        Omid Kwong      Dear Dr Jose Juan Willard MD,    Thank you very much for your referral of Ms. Luis Enrique Gómez to me for evaluation and treatment. My report and recommendations can be found within my office note dated 1/15/2018. If you would like a copy of today's note, please contact my office at the phone number listed below. I appreciate your confidence in me and thank you for allowing me the opportunity to care for your patients. If I can be of any further assistance to you on this or any other patient, please do not hesitate to contact me.     Sincerely,    Omid Henao PA-C  Electronically signed by Omid Henao PA-C on 1/15/2018 at 1:58 PM     Contact Information:  [place], [address] [facility] Staff  [phone], [address]

## 2018-01-15 NOTE — FLOWSHEET NOTE
[facility]edics and [facility] Rehabilitation, [place]    Physical Therapy Daily Treatment Note  Date:  1/15/2018    Patient Name:  Emilia Jerry    :  1960  MRN: [id number]  Medical/Treatment Diagnosis Information:  · Diagnosis: s/p L TKA 17  · Treatment Diagnosis: R26.2 - difficulty walking; M25.562 - L knee pain  Insurance/Certification information:  PT Insurance Information: [employer] [place] - medical necessity, no auth  Physician Information:  Referring Practitioner: Dr. Price Sport of care signed (Y/N): Y     Date of Patient follow up with Physician: not scheduled     G-Code (if applicable):      Date G-Code Applied:  17       Progress Note: []  Yes  [x]  No  Next due by: Visit #10       Latex Allergy:  [x]NO      []YES  Preferred Language for Healthcare:   [x]English       []other:    Visit # Insurance Allowable   5 MN     Pain level:  0/10     SUBJECTIVE:  Reports knee feels \"stiff and full\" today and \"tight\" from walking in snow. Drove self to therapy this date. Medication:   Medrol pack: continued   Percocet BID. Relevant Medical History:  R knee scope   L knee [id number]    OBJECTIVE:   1/15:  Pt arrived to PT w/o AD  Gait: good mechanics.      L Knee  PROM   AROM - 1/11   Knee Flexion 125 w/ ERMI 111   Knee Extension 0 nt          RESTRICTIONS/PRECAUTIONS: n/a    Exercises/Interventions:     Therapeutic Exercises  Resistance / level Sets/sec Reps Notes       dc   Standing gastroc stretch  30\" 5    Seated HS stretch  30\" 5        HEP: 10\" x 10   ERMI - knee flexion PROM Blue foam 10\" 10    Quad stretch  3 30\" Added 1/15          SLR - flexion 5# 3 10    SLR - abduction 5# 3 10    Leg extension - LAQ 5# 3 10    Hamstring curls - standing 5# 3 10    Leg press SL 45# 3 10 Range: 90/10    Calf raises 16# 3 10    TKE blue 3 10 Added 1/15   Bike ROM 8'            Neuromuscular Re-ed / Therapeutic Activities           dc       dc   Fwd step up and over proximal hip and/or LS spine soft tissue/joints for the purpose of modulating pain, promoting relaxation,  increasing ROM, reducing/eliminating soft tissue swelling/inflammation/restriction, improving soft tissue extensibility and allowing for proper ROM for normal function with self care, mobility, lifting and ambulation. Modalities:  [] (85355) Vasopneumatic compression: Utilized vasopneumatic compression to decrease edema / swelling for the purpose of improving mobility and quad tone / recruitment which will allow for increased overall function including but not limited to self-care, transfers, ambulation, and ascending / descending stairs. Modalities:   Cold pack x 10'     Charges:  Timed Code Treatment Minutes: 50   Total Treatment Minutes: 75     [] EVAL - LOW (07695)   [] EVAL - MOD (24988)  [] EVAL - HIGH (77326)  [] RE-EVAL (12620)  [x] AM(46081) x  2   [] IONTO  [x] NMR (65055) x  1   [] VASO  [] Manual (08651) x       [] Other:  [] TA x       [] Mech Traction (93643)  [] ES(attended) (16379)      [] ES (un) (25462):     GOALS:  Patient stated goal: return to horseback riding, caring for farm and wood working     Therapist goals for Patient:   Short Term Goals: To be achieved in: 2 weeks  1. Independent in HEP and progression per patient tolerance, in order to prevent re-injury - met 1/9.   2. Patient will have a decrease in pain to facilitate improvement in movement, function, and ADLs as indicated by Functional Deficits - met 1.9.     Long Term Goals: To be achieved in: 8 weeks  1. Disability index score of 10% or less for the LEFS to assist with reaching prior level of function. 2. Patient will demonstrate increased AROM to at least 0 - 130 to allow for proper joint functioning as indicated by patients Functional Deficits.    3. Patient will demonstrate an increase in Strength to at least 5/5 as well as good proximal hip strength and control to allow for proper functional mobility as Meets All Above Stages   []  Not Ready for Return to Sports   Comments:            PLAN: PT 2x / wk.     [x] Continue per plan of care [] Alter current plan (see comments)  [] Plan of care initiated [] Hold pending MD visit [] Discharge    Electronically signed by: Kelly Cameron [id number]

## 2018-01-15 NOTE — PATIENT INSTRUCTIONS
PATIENT INSTRUCTIONS  For Rudy  Office Visit with Dominick Sheffield PA-C on 1/15/2018    Activity & PT Instructions:   No sports until cleared by MD/PT   No driving if you are taking narcotic pain medications or muscle relaxers   Keep extremity elevated   PT or Home Exercise Program for 3 months post-op    Note: PT is usually twice a week for 3 months (will be extended if necessary)   \"Remember to: [employer] and [employer]" your knee as instructed by Dr Karolina Tapia    Weight Bearing:   Full weightbearing as tolerated if cleared by MD/PT    Wound Instructions:   Use Vit E oil on incisions once a day   If in the sun, protect incisions from sun by using:     Sunscreen 30-50 SPF, reapply regularly. Elevate operative limb above heart level   Ice to knee for 15  20 minutes 3 x day    (\"ICE IS YOUR FRIEND\": try using a bag of frozen peas or corn)      Call the office ([phone], Option 3) if:     The incision becomes red, swollen or develops drainage. You develop a fever greater than 101 degrees. Follow-up Care:   I have asked Rudy to schedule a follow-up appointment in 6 to 8 weeks. She is specifically instructed to contact the office between now & her scheduled appointment if she has concerns related to her condition. She is welcome to call for an appointment sooner if she has any additional concerns or questions. Medication Instructions: for Rudy  Allergies: Review of patient's allergies indicates no known allergies. Any prescriptions must be used as directed. Narcotic prescriptions will be printed out and given to you in the office. Non-narcotic prescriptions will be sent to your pharmacy for pickup to be use as directed unless you request a printed prescription. If you have any concerns, questions or require refills, please contact our office ([phone], Option 3). If you experience any adverse reactions, stop the medication and call our office immediately.       Antibiotics after Total Knee Replacements:     [x] Prophylactic antibiotics before routine dental cleaning are not required. [x] Other surgeries/procedures do require antibiotics (colonoscopy, abcesses/ active infections, etc.)    If you are NOT allergic to penicillin: 2 grams of amoxicillin, cephalexin or cephradine (orally) OR 2 grams of ampicillin or 1 gram of cefazolin (intra-muscularly or intravenously) 1 hour before the procedure. If you ARE allergic to penicillin[de-identified] 600 milligrams of clindamycin (orally or intravenously) 1 hour before the procedure. PLEASE NOTE: Antibiotics may vary based on clinical situation and culture results. PATIENT REMINDER:   Carry a list of your medications and allergies with you at all times. Call your pharmacy and our office at least 1 week in advance to refill prescriptions. Narcotic medications will not be refilled after hours or on weekends. ATTENTION    As of October 2, 2014, the [facility] ([facility]) has mandated that ALL PRESCRIPTION PAIN MEDICINE cannot be called into your pharmacy. This includes:    Oxycodone (Percocet)  Hydrocodone (Vicodin, Norco)  Tramadol (Ultram)  Other    These medications must have prescriptions which are written and signed by your provider. This means that you must call ahead and come in to the office to  the paper prescription and take it to your pharmacy. We are sorry for any inconvenience but this is now the law. Tennille Contreras PA-C  Physician Assistant, Orthopaedic Surgery    Contact Information:  Reba Chavez,  &  Clinical Staff  [phone], Option 3         IMPORTANT NARCOTIC INFORMATION: PLEASE READ     [x] DO NOT share your prescription medication with anyone! Sharing is illegal.  The prescription dose is based on your age, weight, and health problems.   Sharing your narcotic prescription can lead to accidental death of the individual for which the prescription was not

## 2018-01-15 NOTE — PROGRESS NOTES
mouth daily Take one tab by mouth every day with food. MITRA Santacruz   levothyroxine (SYNTHROID) 112 MCG tablet Take 1 tablet by mouth daily  Candelario Todd MD     Social History     Social History    Marital status: Life Partner     Spouse name: N/A    Number of children: N/A    Years of education: N/A     Occupational History    Not on file. Social History Main Topics    Smoking status: Former Smoker    Smokeless tobacco: Never Used      Comment: quit 2010    Alcohol use Yes      Comment: rarely    Drug use: No    Sexual activity: Not on file     Other Topics Concern    Not on file     Social History Narrative    No narrative on file     No family history on file. Review of Systems (ROS):    [x]Performed. Marcus Yanez's review of systems has been performed by intake and observation. The most recent ROS was scanned into the media tab of the chart on 12/18/2017. She has been instructed to contact her primary care physician regarding ROS issues if not already being addressed at this time. There are no recent changes. Objective:   Physical Exam  Vital Signs:  /89   Ht 5' 2\" (1.575 m)   Wt 150 lb (68 kg)   BMI 27.44 kg/m²     Constitution:  Generally, Lili Parker is [x] alert, [x] appears stated age, and [x] in no distress. Her general body habitus is [] Cachectic  [] Thin  [x] Normal  [] Obese  [] Morbidly Obese    Head: [x] Normocephalic  Eyes: [x] Extra-occular muscles intact   [x]  Wears glasses  Left Ear: [x] External Ear normal   Right Ear: [x] External Ear normal   Nose: [x] Normal  Mouth: [x] Oral mucosa moist  [x] No perioral lesions    Pulmonary: [x] Respirations unlabored and regular  Skin: [x] Warm [x] Well perfused     Psychiatric:   [x] Good judgement and insight  [x] Oriented to [x] person, [x] place, and [x] time. [x] Mood appropriate for circumstances.     Gait:   Gait is [x] Normal  [] Impaired  Assistive Device: [x] None  [] Knee Brace  [] Cane  [] TKHTOERH

## 2018-01-19 ENCOUNTER — HOSPITAL ENCOUNTER (OUTPATIENT)
Dept: PHYSICAL THERAPY | Age: 58
Discharge: HOME | End: 2018-01-19
Admitting: ORTHOPAEDIC SURGERY

## 2018-01-19 NOTE — FLOWSHEET NOTE
tissue/joints for the purpose of modulating pain, promoting relaxation,  increasing ROM, reducing/eliminating soft tissue swelling/inflammation/restriction, improving soft tissue extensibility and allowing for proper ROM for normal function with self care, mobility, lifting and ambulation. Modalities:  [] (42884) Vasopneumatic compression: Utilized vasopneumatic compression to decrease edema / swelling for the purpose of improving mobility and quad tone / recruitment which will allow for increased overall function including but not limited to self-care, transfers, ambulation, and ascending / descending stairs. 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 Independent in HEP and progression per patient tolerance, in order to prevent re-injury - met 1/9.   2. Patient will have a decrease in pain to facilitate improvement in movement, function, and ADLs as indicated by Functional Deficits - met 1.9.     Long Term Goals: To be achieved in: 8 weeks  1. Disability index score of 10% or less for the LEFS to assist with reaching prior level of function. 2. Patient will demonstrate increased AROM to at least 0 - 130 to allow for proper joint functioning as indicated by patients Functional Deficits. 3. Patient will demonstrate an increase in Strength to at least 5/5 as well as good proximal hip strength and control to allow for proper functional mobility as indicated by patients Functional Deficits. 4. Patient will return to functional activities including walking community distances without increased symptoms or restriction. 5. Patient will return to functional activities including ascending and descending 1 flight of stairs without increased symptoms or restriction. 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 7. Patient will return to horseback riding without increased symptoms or restriction in 3-4 months. Progression Towards Functional goals:  [x] Patient is progressing as expected towards functional goals listed. [] Progression is slowed due to complexities listed. [] Progression has been slowed due to co-morbidities. [] Plan just implemented, too soon to assess goals progression  [] Other:     Persisting Functional Limitations/Impairments:  []Sitting [x]Standing   [x]Walking [x]Squatting/bending    [x]Stairs []ADL's    []Transfers []Reaching  [x]Housework []Job related tasks  [x]Driving [x]Sports/Recreation   []Other:    ASSESSMENT:  Pt continues to demonstrate some balance deficits with exercise but other wise demonstrates improving strength in quad. Pt will continue to benefit from skilled therapy to improve endurance and dynamic stability for return to farm/horseback riding activity without limitations.      Treatment/Activity Tolerance:  [x] Patient tolerated treatment well [] Patient limited by fatique  [] Patient limited by pain  [] Patient limited by other medical complications  [] Other:     Prognosis: [x] Good [] Fair  [] Poor    Patient Requires Follow-up: [x] Yes  [] No    Return to Play:    [x]  N/A   []  Stage 1: Intro to Strength   []  Stage 2: Return to Run and Strength   []  Stage 3: Return to Jump and Strength   []  Stage 4: Dynamic Strength and Agility   []  Stage 5: Sport Specific Training     []  Ready to Return to Play, Meets All Above Stages   []  Not Ready for Return to Sports   Comments:

## 2018-01-22 ENCOUNTER — HOSPITAL ENCOUNTER (OUTPATIENT)
Dept: PHYSICAL THERAPY | Age: 58
Discharge: HOME | End: 2018-01-22
Admitting: ORTHOPAEDIC SURGERY

## 2018-01-22 NOTE — FLOWSHEET NOTE
16# 3 10    TKE Black  3 10           Bike  10'            Neuromuscular Re-ed / Therapeutic Activities           dc       dc   Fwd step up and over L1 3 10 Increase step height npv   [employer] balance RC L9 4'     SLB  15\" 5           Manual Intervention       Knee mobs/PROM       Tib/Fem Mobs       Patella Mobs       Ankle mobs                       Patient Education: continue stretching BID, but limit strengthening to QID     Therapeutic Exercise and NMR EXR  [x] (11096) Provided verbal/tactile cueing for activities related to strengthening, flexibility, endurance, ROM for improvements in LE, proximal hip, and core control with self care, mobility, lifting, ambulation. [x] (26915) Provided verbal/tactile cueing for activities related to improving balance, coordination, kinesthetic sense, posture, motor skill, proprioception  to assist with LE, proximal hip, and core control in self care, mobility, lifting, ambulation and eccentric single leg control.      NMR and Therapeutic Activities:    [x] (42145 or 53904) Provided verbal/tactile cueing for activities related to improving balance, coordination, kinesthetic sense, posture, motor skill, proprioception and motor activation to allow for proper function of core, proximal hip and LE with self care and ADLs  [] (99555) Gait Re-education- Provided training and instruction to the patient for proper LE, core and proximal hip recruitment and positioning and eccentric body weight control with ambulation re-education including up and down stairs     Home Exercise Program:    [x] (93041) Reviewed/Progressed HEP activities related to strengthening, flexibility, endurance, ROM of core, proximal hip and LE for functional self-care, mobility, lifting and ambulation/stair navigation   [] (71687)Reviewed/Progressed HEP activities related to improving balance, coordination, kinesthetic sense, posture, motor skill, proprioception of core, proximal hip and LE for self care, mobility, Functional Deficits. 3. Patient will demonstrate an increase in Strength to at least 5/5 as well as good proximal hip strength and control to allow for proper functional mobility as indicated by patients Functional Deficits. 4. Patient will return to functional activities including walking community distances without increased symptoms or restriction. 5. Patient will return to functional activities including ascending and descending 1 flight of stairs without increased symptoms or restriction. 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 7. Patient will return to horseback riding without increased symptoms or restriction in 3-4 months. Progression Towards Functional goals:  [x] Patient is progressing as expected towards functional goals listed. [] Progression is slowed due to complexities listed. [] Progression has been slowed due to co-morbidities. [] Plan just implemented, too soon to assess goals progression  [] Other:     Persisting Functional Limitations/Impairments:  []Sitting [x]Standing   [x]Walking [x]Squatting/bending    [x]Stairs []ADL's    []Transfers []Reaching  [x]Housework []Job related tasks  [x]Driving [x]Sports/Recreation   []Other:    ASSESSMENT:  Pt tolerated tx well. Gait continues to be normal. No significant change in knee flexion AROM. Pt will continue to benefit from skilled PT for progression of ROM, strength, balance and NM re-ed which will allow for improved performance of and tolerance to ADLs, household chores and activities.      Treatment/Activity Tolerance:  [x] Patient tolerated treatment well [] Patient limited by fatique  [] Patient limited by pain  [] Patient limited by other medical complications  [] Other:     Prognosis: [x] Good [] Fair  [] Poor    Patient Requires Follow-up: [x] Yes  [] No    Return to Play:    [x]  N/A   []  Stage 1: Intro to Strength   []  Stage 2: Return to [facility] and Strength   []  Stage 3: Return to Jump and Strength   []  Stage 4: Dynamic Strength and Agility   []  Stage 5: Sport Specific Training     []  Ready to Return to Play, Meets All Above Stages   []  Not Ready for Return to Sports   Comments:            PLAN: PT 2x / wk. Continue to progress as tolerated.    [x] Continue per plan of care [] Alter current plan (see comments)  [] Plan of care initiated [] Hold pending MD visit [] Discharge    Electronically signed by: Marissa Dumas PT, DPT

## 2018-01-26 ENCOUNTER — HOSPITAL ENCOUNTER (OUTPATIENT)
Dept: PHYSICAL THERAPY | Age: 58
Discharge: HOME | End: 2018-01-26
Admitting: ORTHOPAEDIC SURGERY

## 2018-01-26 NOTE — FLOWSHEET NOTE
functioning as indicated by patients Functional Deficits. 3. Patient will demonstrate an increase in Strength to at least 5/5 as well as good proximal hip strength and control to allow for proper functional mobility as indicated by patients Functional Deficits. 4. Patient will return to functional activities including walking community distances without increased symptoms or restriction. 5. Patient will return to functional activities including ascending and descending 1 flight of stairs without increased symptoms or restriction. 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 7. Patient will return to horseback riding without increased symptoms or restriction in 3-4 months. Progression Towards Functional goals:  [x] Patient is progressing as expected towards functional goals listed. [] Progression is slowed due to complexities listed. [] Progression has been slowed due to co-morbidities. [] Plan just implemented, too soon to assess goals progression  [] Other:     Persisting Functional Limitations/Impairments:  []Sitting [x]Standing   [x]Walking [x]Squatting/bending    [x]Stairs []ADL's    []Transfers []Reaching  [x]Housework []Job related tasks  [x]Driving [x]Sports/Recreation   []Other:    ASSESSMENT:  Pt continues to demonstrate improved motion and strength in knee and demonstrates good tolerance to upgrades in exercise program.  Discussed performing re-evaluation in next couple of visits and will discuss with PT regarding potential to modify frequency of visits. 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       Treatment/Activity Tolerance:  [x] Patient tolerated treatment well [] Patient limited by rachael  [] Patient limited by

## 2018-01-29 ENCOUNTER — HOSPITAL ENCOUNTER (OUTPATIENT)
Dept: PHYSICAL THERAPY | Age: 58
Discharge: HOME | End: 2018-01-29
Admitting: ORTHOPAEDIC SURGERY

## 2018-01-29 NOTE — FLOWSHEET NOTE
patients Functional Deficits. 3. Patient will demonstrate an increase in Strength to at least 5/5 as well as good proximal hip strength and control to allow for proper functional mobility as indicated by patients Functional Deficits. 4. Patient will return to functional activities including walking community distances without increased symptoms or restriction. 5. Patient will return to functional activities including ascending and descending 1 flight of stairs without increased symptoms or restriction. 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 7. Patient will return to horseback riding without increased symptoms or restriction in 3-4 months. Progression Towards Functional goals:  [x] Patient is progressing as expected towards functional goals listed. [] Progression is slowed due to complexities listed. [] Progression has been slowed due to co-morbidities. [] Plan just implemented, too soon to assess goals progression  [] Other:     Persisting Functional Limitations/Impairments:  []Sitting [x]Standing   [x]Walking [x]Squatting/bending    [x]Stairs []ADL's    []Transfers []Reaching  [x]Housework []Job related tasks  [x]Driving [x]Sports/Recreation   []Other:    ASSESSMENT:  Upgraded SLB this date to [employer] and added resisted walking to program this date. Pt challenged by upgraded balance this date but performed all exercises without increased issues. Pt is progressing well and demonstrates good functional mobility and motion in knee at this time. Continue to work on improved balance and strength as tolerated .      Treatment/Activity Tolerance:  [x] Patient tolerated treatment well [] Patient limited by fatique  [] Patient limited by pain  [] Patient limited by other medical complications  [] Other:     Prognosis: [x] Good [] Fair  [] Poor    Patient Requires Follow-up: [x] Yes  [] No    Return to Play:    [x]

## 2018-02-01 ENCOUNTER — HOSPITAL ENCOUNTER (OUTPATIENT)
Dept: PHYSICAL THERAPY | Age: 58
Discharge: HOME | End: 2018-02-28
Attending: ORTHOPAEDIC SURGERY | Admitting: ORTHOPAEDIC SURGERY

## 2018-02-02 ENCOUNTER — HOSPITAL ENCOUNTER (OUTPATIENT)
Dept: PHYSICAL THERAPY | Age: 58
Discharge: HOME | End: 2018-02-03
Admitting: ORTHOPAEDIC SURGERY

## 2018-02-02 NOTE — PLAN OF CARE
around  Mobility: Walking and Moving Around Current Status (): At least 20 percent but less than 40 percent impaired, limited or restricted  Mobility: Walking and Moving Around Goal Status (): At least 1 percent but less than 20 percent impaired, limited or restricted    Progress Note: [x]  Yes  []  No  Next due by: Visit #20       Latex Allergy:  [x]NO      []YES  Preferred Language for Healthcare:   [x]English       []other:    Visit # Insurance Allowable   10 MN     Pain level:  0/10     SUBJECTIVE:   Has been using elliptical at home every morning x4 mintues for warm up without issues. No pain this morning, just stiffness. States having \"popping\" in posterior lateral knee \"every time I bend it\". Medication:   Medrol pack: continued   Percocet BID. Relevant Medical History:  R knee scope 1983  L knee [id number]    OBJECTIVE:   2/2/18:  Pt arrived to PT w/o AD  Gait: good mechanics.    L Knee  AROM    Knee Flexion 118   Knee Extension 0      Strength (0-5) - NT due to recent surgery Left Right   Hip Flexion - supine 4+ nt   Hip Flexion - seated 5 4+   Hip Abduction 3+ nt   Hip Adduction 4- nt   Hip Extension 3+    Quads 4- 4+   Hamstrings 4+ 4+       RESTRICTIONS/PRECAUTIONS: n/a    Exercises/Interventions:     Therapeutic Exercises  Resistance / level Sets/sec Reps Notes       dc   Standing gastroc stretch - incline  30\" 5    Seated HS stretch  30\" 5        HEP: 10\" x 10   ERMI - knee flexion PROM Blue foam 10\" 10    Quad stretch  3 30\" Added 1/15          SLR - flexion 7# 3 10    SLR - abduction 7# 3 10    Leg extension  DL 35# 3 10 Range: 90/30   Hamstring curls  DL 35# 3 10 Range: 0/90   Leg press SL 50# 3 10 Range: 90/10    Resisted lateral walking Black  10 steps 3 laps Added 1/29  ^2/2   Calf raises 20# 3 10 ^2/2   TKE silver 3 10 ^2/2          Bike  10'            SLR+ 2# 30\" 3 Added 2/2                 Neuromuscular Re-ed / Therapeutic Activities           dc       dc   Fwd step up and over L2 3 10 ^1/26   LSU    npv   Biodex balance- [place] level 2 RC L9 4'  ^2/2   SLB w/ ball toss airex  4# ball 1 20 ^1/29  ^2/2   Tandem balance- airex  30\" 2 Added 2/2          Manual Intervention       Knee mobs/PROM       Tib/Fem Mobs       Patella Mobs       Ankle mobs                       Patient Education: continue stretching BID, but limit strengthening to QID   Discussed progress and time frames for full recovery. Therapeutic Exercise and NMR EXR  [x] (13970) Provided verbal/tactile cueing for activities related to strengthening, flexibility, endurance, ROM for improvements in LE, proximal hip, and core control with self care, mobility, lifting, ambulation. [x] (14102) Provided verbal/tactile cueing for activities related to improving balance, coordination, kinesthetic sense, posture, motor skill, proprioception  to assist with LE, proximal hip, and core control in self care, mobility, lifting, ambulation and eccentric single leg control.      NMR and Therapeutic Activities:    [x] (69270 or 03969) Provided verbal/tactile cueing for activities related to improving balance, coordination, kinesthetic sense, posture, motor skill, proprioception and motor activation to allow for proper function of core, proximal hip and LE with self care and ADLs  [] (11265) Gait Re-education- Provided training and instruction to the patient for proper LE, core and proximal hip recruitment and positioning and eccentric body weight control with ambulation re-education including up and down stairs     Home Exercise Program:    [x] (19639) Reviewed/Progressed HEP activities related to strengthening, flexibility, endurance, ROM of core, proximal hip and LE for functional self-care, mobility, lifting and ambulation/stair navigation   [] (04880)Reviewed/Progressed HEP activities related to improving balance, coordination, kinesthetic sense, posture, motor skill, proprioception of core, proximal hip and LE for self care,

## 2018-02-05 ENCOUNTER — HOSPITAL ENCOUNTER (OUTPATIENT)
Dept: PHYSICAL THERAPY | Age: 58
Discharge: HOME | End: 2018-02-06
Admitting: ORTHOPAEDIC SURGERY

## 2018-02-05 NOTE — FLOWSHEET NOTE
[facility] [place]      Physical Therapy Daily Treatment Note  Date:  2018    Patient Name:  Devora Aguirre    :  1960  MRN: [id number]  Medical/Treatment Diagnosis Information:  · Diagnosis: s/p L TKA 17  · Treatment Diagnosis: R26.2 - difficulty walking; M25.562 - L knee pain  Insurance/Certification information:  PT Insurance Information: Medical [place] - medical necessity, no auth  Physician Information:  Referring Practitioner: Dr. Escobar Rides of care signed (Y/N): Y     Date of Patient follow up with Physician: not scheduled     G-Code (if applicable):  46    Date G-Code Applied:  18       Progress Note: []  Yes  [x]  No  Next due by: Visit #20       Latex Allergy:  [x]NO      []YES  Preferred Language for Healthcare:   [x]English       []other:    Visit # Insurance Allowable   11 MN     Pain level:  0/10     SUBJECTIVE:   Pt reports knee is \"stiff this morning. \"     HEP: pt has a [place] at home and is able to replicate all exercises except the leg press. Also notes doing table exercises bilaterally at home. Relevant Medical History:  R knee scope   L knee [id number]    OBJECTIVE:   18:  Pt arrived to PT w/o AD  Gait: good mechanics.    L Knee  AROM    Knee Flexion 118   Knee Extension 0      Strength (0-5) - NT due to recent surgery Left Right   Hip Flexion - supine 4+ nt   Hip Flexion - seated 5 4+   Hip Abduction 3+ nt   Hip Adduction 4- nt   Hip Extension 3+    Quads 4- 4+   Hamstrings 4+ 4+       RESTRICTIONS/PRECAUTIONS: n/a    Exercises/Interventions:     Therapeutic Exercises  Resistance / level Sets/sec Reps Notes       dc   Standing gastroc stretch - incline  30\" 5    Seated HS stretch  30\" 5        dc       dc   Quad stretch  30\" 3           SLR - flexion 7.5# 3 10    SLR - abduction 7.5# 3 10    Leg extension  DL 35# 3 10 Range: 90/30   Hamstring curls  DL 35# 3 10 Range: 0/90   Leg press SL 60# (11212) Reviewed/Progressed HEP activities related to strengthening, flexibility, endurance, ROM of core, proximal hip and LE for functional self-care, mobility, lifting and ambulation/stair navigation   [] (02633)Reviewed/Progressed HEP activities related to improving balance, coordination, kinesthetic sense, posture, motor skill, proprioception of core, proximal hip and LE for self care, mobility, lifting, and ambulation/stair navigation      Manual Treatments:  PROM / STM / Oscillations-Mobs:  G-I, II, III, IV (PA's, Inf., Post.)  [] (87768) Provided manual therapy to mobilize LE, proximal hip and/or LS spine soft tissue/joints for the purpose of modulating pain, promoting relaxation,  increasing ROM, reducing/eliminating soft tissue swelling/inflammation/restriction, improving soft tissue extensibility and allowing for proper ROM for normal function with self care, mobility, lifting and ambulation. Modalities:  [] (02069) Vasopneumatic compression: Utilized vasopneumatic compression to decrease edema / swelling for the purpose of improving mobility and quad tone / recruitment which will allow for increased overall function including but not limited to self-care, transfers, ambulation, and ascending / descending stairs. Modalities:   Cold pack x 10'     Charges:  Timed Code Treatment Minutes: 54   Total Treatment Minutes: 80     [] EVAL - LOW (32628)   [] EVAL - MOD (22253)  [] EVAL - HIGH (12942)  [] RE-EVAL (32436)  [x] AW(15270) x  3   [] IONTO  [x] NMR (44619) x  1   [] VASO  [] Manual (90043) x       [] Other:  [] TA x       [] Mech Traction (12157)  [] ES(attended) (21737)      [] ES (un) (17729):     GOALS:  Patient stated goal: return to horseback riding, caring for farm and wood working     Therapist goals for Patient:   Short Term Goals: To be achieved in: 2 weeks  1.  Independent in HEP and progression per patient tolerance, in order to prevent re-injury - met 1/9.   2. Patient will have a

## 2018-02-12 ENCOUNTER — HOSPITAL ENCOUNTER (OUTPATIENT)
Dept: PHYSICAL THERAPY | Age: 58
Discharge: HOME | End: 2018-02-13
Admitting: ORTHOPAEDIC SURGERY

## 2018-02-19 ENCOUNTER — HOSPITAL ENCOUNTER (OUTPATIENT)
Dept: PHYSICAL THERAPY | Age: 58
Discharge: HOME | End: 2018-02-20
Admitting: ORTHOPAEDIC SURGERY

## 2018-02-19 NOTE — FLOWSHEET NOTE
strength for return to horse riding without limitation and return to performing functional daily tasks without safety concerns. Treatment/Activity Tolerance:  [x] Patient tolerated treatment well [] Patient limited by fatique  [] Patient limited by pain  [] Patient limited by other medical complications  [] Other:     Prognosis: [x] Good [] Fair  [] Poor    Patient Requires Follow-up: [x] Yes  [] No    Return to Play:    [x]  N/A   []  Stage 1: Intro to Strength   []  Stage 2: Return to Run and Strength   []  Stage 3: Return to Jump and Strength   []  Stage 4: Dynamic Strength and Agility   []  Stage 5: Sport Specific Training     []  Ready to Return to Play, Meets All Above Stages   []  Not Ready for Return to Sports   Comments:            PLAN: PT 1x / wk. Continue to progress as tolerated.    [x] Continue per plan of care [] Alter current plan (see comments)  [] Plan of care initiated [] Hold pending MD visit [] Discharge    Electronically signed by: Jai [id number]

## 2018-02-26 ENCOUNTER — HOSPITAL ENCOUNTER (OUTPATIENT)
Dept: PHYSICAL THERAPY | Age: 58
Discharge: HOME | End: 2018-02-27
Admitting: ORTHOPAEDIC SURGERY

## 2018-02-26 NOTE — FLOWSHEET NOTE
[facility] [place]      Physical Therapy Daily Treatment Note  Date:  2018    Patient Name:  Doreen Valiente    :  1960  MRN: [id number]  Medical/Treatment Diagnosis Information:  · Diagnosis: s/p L TKA 17  · Treatment Diagnosis: R26.2 - difficulty walking; M25.562 - L knee pain  Insurance/Certification information:  PT Insurance Information: Medical [place] - medical necessity, no auth  Physician Information:  Referring Practitioner: Dr. Jeffery Pedroza of care signed (Y/N): Y     Date of Patient follow up with Physician: not scheduled     G-Code (if applicable):      Date G-Code Applied:  18       Progress Note: []  Yes  [x]  No  Next due by: Visit #20       Latex Allergy:  [x]NO      []YES  Preferred Language for Healthcare:   [x]English       []other:    Visit # Insurance Allowable   14 MN     Pain level:  0/10     SUBJECTIVE:  Pt reports continued stiffness. More stiff than usual today after increased standing / walking yesterday. HEP: pt reports compliance with HEP and gym exercises (has a Universal system at home). Relevant Medical History:  R knee scope   L knee [id number]    OBJECTIVE:   18:  L Knee  AROM    Knee Flexion 120   Knee Extension 0       18:  Pt arrived to PT w/o AD  Gait: good mechanics.    L Knee  AROM    Knee Flexion 118   Knee Extension 0      Strength (0-5) - NT due to recent surgery Left Right   Hip Flexion - supine 4+ nt   Hip Flexion - seated 5 4+   Hip Abduction 3+ nt   Hip Adduction 4- nt   Hip Extension 3+    Quads 4- 4+   Hamstrings 4+ 4+       RESTRICTIONS/PRECAUTIONS: n/a    Exercises/Interventions:     Therapeutic Exercises  Resistance / level Sets/sec Reps Notes   Standing gastroc stretch - incline  30\" 5    Seated HS stretch  30\" 5    Quad stretch  30\" 3    SLR - flexion 8.5# 3 10 ^   SLR - abduction 8.5# 3 10 ^   Leg extension  DL 40# 3 10 Range: 90/30   Hamstring curls DL 40# 3 10 Range: 0/90   Leg press SL 60# 3 10 Range: 90/10  Attempted 65# but too heavy. Resisted lateral walking blue loop 10 steps 3 laps ^2/26   Calf raises SL 3# 3 10 ^2/26   SL Deadlifts 5# 3 10 Light finger hold assist for balance   Bike L2 10'            Neuromuscular Re-ed / Therapeutic Activities       LSU Phone book 1 10 Emphasis on eccentric quad control and keeping hips level. Biodex balance - [place] size 2 RC L7 4'     SLB  airex w/ 4# plyotoss 3 30    SLB BOSU  15\" 10 Added 2/26          Manual Intervention       Knee mobs/PROM       Tib/Fem Mobs       Patella Mobs       Ankle mobs         Patient Education:   HEP: continue stretching BID and strengthening 3x / wk. Therapeutic Exercise and NMR EXR  [x] (63804) Provided verbal/tactile cueing for activities related to strengthening, flexibility, endurance, ROM for improvements in LE, proximal hip, and core control with self care, mobility, lifting, ambulation. [x] (71690) Provided verbal/tactile cueing for activities related to improving balance, coordination, kinesthetic sense, posture, motor skill, proprioception  to assist with LE, proximal hip, and core control in self care, mobility, lifting, ambulation and eccentric single leg control.      NMR and Therapeutic Activities:    [x] (23538 or 42737) Provided verbal/tactile cueing for activities related to improving balance, coordination, kinesthetic sense, posture, motor skill, proprioception and motor activation to allow for proper function of core, proximal hip and LE with self care and ADLs  [] (81365) Gait Re-education- Provided training and instruction to the patient for proper LE, core and proximal hip recruitment and positioning and eccentric body weight control with ambulation re-education including up and down stairs     Home Exercise Program:    [x] (48274) Reviewed/Progressed HEP activities related to strengthening, flexibility, endurance, ROM of core, proximal hip and LE for

## 2018-03-01 ENCOUNTER — HOSPITAL ENCOUNTER (OUTPATIENT)
Dept: PHYSICAL THERAPY | Age: 58
Discharge: HOME | End: 2018-03-23
Attending: ORTHOPAEDIC SURGERY | Admitting: ORTHOPAEDIC SURGERY

## 2018-03-05 ENCOUNTER — HOSPITAL ENCOUNTER (OUTPATIENT)
Dept: PHYSICAL THERAPY | Age: 58
Discharge: HOME | End: 2018-03-06
Admitting: ORTHOPAEDIC SURGERY

## 2018-03-05 ENCOUNTER — TELEPHONE (OUTPATIENT)
Dept: FAMILY MEDICINE CLINIC | Age: 58
End: 2018-03-05

## 2018-03-05 DIAGNOSIS — Z13.1 SCREENING FOR DIABETES MELLITUS: ICD-10-CM

## 2018-03-05 DIAGNOSIS — Z13.220 SCREENING, LIPID: Primary | ICD-10-CM

## 2018-03-05 NOTE — TELEPHONE ENCOUNTER
Patient is coming in for [facility] physical,. She would like to have labs drawn prior. Can you please order these.       Please given order to Jessica Glasgow, needs to call patient first.  Please fax lab order to [phone]

## 2018-03-05 NOTE — FLOWSHEET NOTE
Hamstring curls  SL 25# / 20# 1 / 2 10 / 10 Range: 0/90   Leg press SL 65# 3 10 Range: 90/10   Resisted lateral walking Blue loop 3 10 B    Calf raises SL 5# 3 10    SL Deadlifts 5# 3 10    Bike L3 5'  Warm-up. Neuromuscular Re-ed / Therapeutic Activities       LSU Phone book 3 10 Emphasis on eccentric quad control and keeping hips level. Biodex balance LOS L8 2         dc   SLB BOSU Blue up 20\" 5           Manual Intervention       Knee mobs/PROM       Tib/Fem Mobs       Patella Mobs       Ankle mobs         Patient Education:   HEP: continue stretching BID and strengthening 3x / wk. Therapeutic Exercise and NMR EXR  [x] (46756) Provided verbal/tactile cueing for activities related to strengthening, flexibility, endurance, ROM for improvements in LE, proximal hip, and core control with self care, mobility, lifting, ambulation. [x] (16403) Provided verbal/tactile cueing for activities related to improving balance, coordination, kinesthetic sense, posture, motor skill, proprioception  to assist with LE, proximal hip, and core control in self care, mobility, lifting, ambulation and eccentric single leg control.      NMR and Therapeutic Activities:    [x] (90697 or 90745) Provided verbal/tactile cueing for activities related to improving balance, coordination, kinesthetic sense, posture, motor skill, proprioception and motor activation to allow for proper function of core, proximal hip and LE with self care and ADLs  [] (38151) Gait Re-education- Provided training and instruction to the patient for proper LE, core and proximal hip recruitment and positioning and eccentric body weight control with ambulation re-education including up and down stairs     Home Exercise Program:    [x] (30982) Reviewed/Progressed HEP activities related to strengthening, flexibility, endurance, ROM of core, proximal hip and LE for functional self-care, mobility, lifting and ambulation/stair navigation   [] (52268)Reviewed/Progressed HEP activities related to improving balance, coordination, kinesthetic sense, posture, motor skill, proprioception of core, proximal hip and LE for self care, mobility, lifting, and ambulation/stair navigation      Manual Treatments:  PROM / STM / Oscillations-Mobs:  G-I, II, III, IV (PA's, Inf., Post.)  [] (63118) Provided manual therapy to mobilize LE, proximal hip and/or LS spine soft tissue/joints for the purpose of modulating pain, promoting relaxation,  increasing ROM, reducing/eliminating soft tissue swelling/inflammation/restriction, improving soft tissue extensibility and allowing for proper ROM for normal function with self care, mobility, lifting and ambulation. Modalities:  [] (30624) Vasopneumatic compression: Utilized vasopneumatic compression to decrease edema / swelling for the purpose of improving mobility and quad tone / recruitment which will allow for increased overall function including but not limited to self-care, transfers, ambulation, and ascending / descending stairs. Modalities:   Cold pack x 10'     Charges:  Timed Code Treatment Minutes: 40   Total Treatment Minutes: 70     [] EVAL - LOW (00638)   [] EVAL - MOD (25389)  [] EVAL - HIGH (15322)  [] RE-EVAL (55237)  [x] LY(80869) x  2   [] IONTO  [x] NMR (15716) x  1   [] VASO  [] Manual (66029) x       [] Other:  [] TA x       [] Mech Traction (37396)  [] ES(attended) (90450)      [] ES (un) (88293):     GOALS:  Patient stated goal: return to horseback riding, caring for farm and wood working     Therapist goals for Patient:   Short Term Goals: To be achieved in: 2 weeks  1. Independent in HEP and progression per patient tolerance, in order to prevent re-injury - met 1/9.   2. Patient will have a decrease in pain to facilitate improvement in movement, function, and ADLs as indicated by Functional Deficits - met 1/9.     Long Term Goals: To be achieved in: 8 weeks  1.  Disability index score of 10% or

## 2018-03-06 LAB
A/G RATIO: 1.5 (CALC) (ref 0.8–2.6)
ALBUMIN SERPL-MCNC: 4.4 GM/DL (ref 3.5–5.2)
ALP BLD-CCNC: 59 U/L (ref 23–144)
ALT SERPL-CCNC: 14 U/L (ref 0–60)
AST SERPL-CCNC: 18 U/L (ref 0–46)
BILIRUB SERPL-MCNC: 0.4 MG/DL (ref 0–1.2)
BUN / CREAT RATIO: 26 (CALC) (ref 7–25)
BUN BLDV-MCNC: 21 MG/DL (ref 3–29)
CALCIUM SERPL-MCNC: 9.8 MG/DL (ref 8.5–10.5)
CHLORIDE BLD-SCNC: 102 MEQ/L (ref 96–110)
CHOLESTEROL, TOTAL: 258 MG/DL
CO2: 28 MEQ/L (ref 19–32)
COPY(IES) SENT TO:: NORMAL
CREAT SERPL-MCNC: 0.8 MG/DL
GFR SERPL CREATININE-BSD FRML MDRD: 83 ML/MIN/1.73M2
GLOBULIN: 2.9 GM/DL (CALC) (ref 1.9–3.6)
GLUCOSE BLD-MCNC: 90 MG/DL
HDLC SERPL-MCNC: 94 MG/DL
LDL CHOLESTEROL: 142 MG/DL (CALC)
POTASSIUM SERPL-SCNC: 4.6 MEQ/L (ref 3.4–5.3)
SODIUM BLD-SCNC: 142 MEQ/L (ref 135–148)
TOTAL PROTEIN: 7.3 GM/DL (ref 6–8.3)
TRIGL SERPL-MCNC: 110 MG/DL
VLDLC SERPL CALC-MCNC: 22 MG/DL (CALC) (ref 4–38)

## 2018-03-13 ENCOUNTER — OFFICE VISIT (OUTPATIENT)
Dept: FAMILY MEDICINE CLINIC | Age: 58
End: 2018-03-13

## 2018-03-13 VITALS
SYSTOLIC BLOOD PRESSURE: 120 MMHG | HEART RATE: 72 BPM | WEIGHT: 153.6 LBS | OXYGEN SATURATION: 97 % | DIASTOLIC BLOOD PRESSURE: 70 MMHG | TEMPERATURE: 98.4 F | HEIGHT: 63 IN | BODY MASS INDEX: 27.21 KG/M2

## 2018-03-13 DIAGNOSIS — Z00.00 PHYSICAL EXAM, ANNUAL: Primary | ICD-10-CM

## 2018-03-13 DIAGNOSIS — E03.4 HYPOTHYROIDISM DUE TO ACQUIRED ATROPHY OF THYROID: ICD-10-CM

## 2018-03-13 PROCEDURE — G8510 SCR DEP NEG, NO PLAN REQD: HCPCS | Performed by: FAMILY MEDICINE

## 2018-03-13 PROCEDURE — 99396 PREV VISIT EST AGE 40-64: CPT | Performed by: FAMILY MEDICINE

## 2018-03-13 RX ORDER — LEVOTHYROXINE SODIUM 112 UG/1
112 TABLET ORAL DAILY
Qty: 90 TABLET | Refills: 3 | Status: SHIPPED | OUTPATIENT
Start: 2018-03-13 | End: 2019-03-22 | Stop reason: SDUPTHER

## 2018-03-13 ASSESSMENT — PATIENT HEALTH QUESTIONNAIRE - PHQ9
SUM OF ALL RESPONSES TO PHQ9 QUESTIONS 1 & 2: 0
SUM OF ALL RESPONSES TO PHQ QUESTIONS 1-9: 0
1. LITTLE INTEREST OR PLEASURE IN DOING THINGS: 0
2. FEELING DOWN, DEPRESSED OR HOPELESS: 0

## 2018-03-13 NOTE — PROGRESS NOTES
negative  Cardiovascular: negative  Gastrointestinal: negative  Genitourinary: negative  Musculoskeletal: see HPI  Neurologic: negative  Psychiatric: negative  Hematologic/Lymphatic/Immunologic: negative  Endocrine: negative       Objective:      /70 (Site: Left Arm, Position: Sitting, Cuff Size: Medium Adult)   Pulse 72   Temp 98.4 °F (36.9 °C) (Oral)   Ht 5' 3\" (1.6 m)   Wt 153 lb 9.6 oz (69.7 kg)   SpO2 97%   BMI 27.21 kg/m²   General appearance - healthy, alert, no distress  Skin - Skin color, texture, turgor normal. No rashes or lesions. Head - Normocephalic. No masses, lesions, tenderness or abnormalities  Eyes - conjunctivae/corneas clear. PERRL, EOM's intact. Ears - External ears normal. Canals clear. TM's normal.  Nose/Sinuses - Nares normal. Septum midline. Mucosa normal. No drainage or sinus tenderness. Oropharynx - Lips, mucosa, and tongue normal. Teeth and gums normal.  Neck - Neck supple. No adenopathy. Thyroid symmetric, normal size,  Back - Back symmetric, no curvature. ROM normal. No CVA tenderness. Lungs - Percussion normal. Good diaphragmatic excursion. Lungs clear  Heart - Regular rate and rhythm, with no rub, murmur or gallop noted. Abdomen - Abdomen soft, non-tender. BS normal. No masses, organomegaly  Extremities - Extremities normal. No deformities, edema, or skin discoloration  Musculoskeletal - Spine ROM normal. Muscular strength intact. Peripheral pulses - radial=4/4  Neuro - Gait normal. Reflexes normal and symmetric. Sensation grossly normal.  No focal weakness         Assessment:        1. Physical exam, annual     2.  Hypothyroidism due to acquired atrophy of thyroid  levothyroxine (SYNTHROID) 112 MCG tablet                 Plan:          See orders

## 2018-03-22 ENCOUNTER — HOSPITAL ENCOUNTER (OUTPATIENT)
Dept: PHYSICAL THERAPY | Age: 58
Discharge: HOME | End: 2018-03-23
Admitting: ORTHOPAEDIC SURGERY

## 2018-03-22 NOTE — PLAN OF CARE
Blue up 30\" 5           Manual Intervention       Knee mobs/PROM       Tib/Fem Mobs       Patella Mobs       Ankle mobs         Patient Education:   Recommended continued HEP 3x / wk until 1 yr post-op for optimal ROM / strength gains and optimal outcomes. Also discussed benefits of adding this into her exercise routine long-term. Therapeutic Exercise and NMR EXR  [x] (25758) Provided verbal/tactile cueing for activities related to strengthening, flexibility, endurance, ROM for improvements in LE, proximal hip, and core control with self care, mobility, lifting, ambulation. [x] (57175) Provided verbal/tactile cueing for activities related to improving balance, coordination, kinesthetic sense, posture, motor skill, proprioception  to assist with LE, proximal hip, and core control in self care, mobility, lifting, ambulation and eccentric single leg control.      NMR and Therapeutic Activities:    [x] (07184 or 51731) Provided verbal/tactile cueing for activities related to improving balance, coordination, kinesthetic sense, posture, motor skill, proprioception and motor activation to allow for proper function of core, proximal hip and LE with self care and ADLs  [] (71902) Gait Re-education- Provided training and instruction to the patient for proper LE, core and proximal hip recruitment and positioning and eccentric body weight control with ambulation re-education including up and down stairs     Home Exercise Program:    [x] (24843) Reviewed/Progressed HEP activities related to strengthening, flexibility, endurance, ROM of core, proximal hip and LE for functional self-care, mobility, lifting and ambulation/stair navigation   [] (30232)Reviewed/Progressed HEP activities related to improving balance, coordination, kinesthetic sense, posture, motor skill, proprioception of core, proximal hip and LE for self care, mobility, lifting, and ambulation/stair navigation      Manual Treatments:  PROM / STM / demonstrate an increase in Strength to at least 5/5 as well as good proximal hip strength and control to allow for proper functional mobility as indicated by patients Functional Deficits - progressing towards 2/2.   4. Patient will return to functional activities including walking community distances without increased symptoms or restriction - met 2/2.   5. Patient will return to functional activities including ascending and descending 1 flight of stairs without increased symptoms or restriction - progressing towards 2/2.   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   7. Patient will return to horseback riding without increased symptoms or restriction in 3-4 months - progressing towards 2/2. Progression Towards Functional goals:  [x] Patient is progressing as expected towards functional goals listed. [] Progression is slowed due to complexities listed. [] Progression has been slowed due to co-morbidities. [] Plan just implemented, too soon to assess goals progression  [] Other:     Persisting Functional Limitations/Impairments:  []Sitting []Standing   []Walking []Squatting/bending    [x]Stairs []ADL's    []Transfers []Reaching  []Housework []Job related tasks  []Driving [x]Sports/Recreation   []Other:    ASSESSMENT:  Pt tolerated tx well. AROM and strength are gradually improving. Pt reports improvement in function as well. Pt continues to be challenged by progressions and report some muscle burn / fatigue, but no c/o increased pain. Pt is independent with HEP and no longer requires skilled PT. Pt agrees with POC and has no further questions at this time.      Treatment/Activity Tolerance:  [x] Patient tolerated treatment well [] Patient limited by fatique  [] Patient limited by pain  [] Patient limited by other medical complications  [] Other:     Prognosis: [x] Good [] Fair  [] Poor    Patient Requires

## 2018-10-22 NOTE — LETTER
The patient feels that the cataract is significantly impacting daily activities and has elected cataract surgery. The risks, benefits, and alternatives to surgery were discussed. The patient elects to proceed with surgery. She is doing her physical therapy exercises. She does plan on starting outpatient physical therapy next week. She rates her pain as a 1-2 out of 10 at rest and a 4 out of 10 with activity. She describes her pain as dull and aching. It occurs for minutes and is intermittent. Bending, stretching, straightening, exercise aggravate her pain. Rest, ice and Tylenol help to relieve her pain. She does have some night pain. She does have some morning stiffness. Regarding Aspirin Therapy and VTE Precautions:  Genaro Gregg reports she is  taking her aspirin. She denies abnormal bruising or abnormal bleeding from any body orifice such as bleeding from nose or gums, blood in urine or stool, or melena, hemoptysis or hematemesis. She is wearing her OMAR hose stockings.     PAIN ASSESSMENT:   Pain Assessment  Location of Pain: Knee  Location Modifiers: Left  Severity of Pain: 4  Quality of Pain: Dull, Aching  Duration of Pain: A few minutes  Frequency of Pain: Intermittent  Date Pain First Started:  (from sx)  Aggravating Factors: Bending, Stretching, Straightening, Exercise  Limiting Behavior: Yes  Relieving Factors: Rest, Ice, Other (Comment) (tylenol)  Result of Injury: Yes  Work-Related Injury: No  Are there other pain locations you wish to document?: No    Patient Active Problem List   Diagnosis    Hypothyroidism    Chronic pain of left knee    [place] surgery [place] in [place]    S/P right knee surgery 1977    Bipartite patella left knee    Acquired varus deformity knee, left    Post-traumatic osteoarthritis of right knee    Acquired genu valgum of right knee    Osteochondroma of right tibia    S/P TKR (total knee replacement) using cement, left on 12/6/2017     Past Medical History:   Diagnosis Date    Arthritis     Hypothyroidism      Past Surgical History:   Procedure Laterality Date    KNEE SURGERY Left 1976    by Dr. Breanna Elias in [facility]    by Dr. Breanna Elias in [place]  OTHER SURGICAL HISTORY Left 12/06/2017    LEFT TOTAL KNEE ARTHROPLASTY                 Allergies:  Review of patient's allergies indicates no known allergies. Medications:  Prior to Visit Medications    Medication Sig Taking? Authorizing Provider   oxyCODONE-acetaminophen (PERCOCET) 5-325 MG per tablet Take 1 tablet by mouth every 4 hours as needed for Pain . Earliest Fill Date: 12/12/17  Ortega Paz MD   aspirin 325 MG EC tablet Take 1 tablet by mouth 2 times daily (with meals)  MITRA Kaba   levothyroxine (SYNTHROID) 112 MCG tablet Take 1 tablet by mouth daily  Loren Fraser MD     Social History     Social History    Marital status: Life Partner     Spouse name: N/A    Number of children: N/A    Years of education: N/A     Occupational History    Not on file. Social History Main Topics    Smoking status: Former Smoker    Smokeless tobacco: Never Used      Comment: quit 2010    Alcohol use Yes      Comment: rarely    Drug use: No    Sexual activity: Not on file     Other Topics Concern    Not on file     Social History Narrative    No narrative on file     History reviewed. No pertinent family history. Review of Systems (ROS):    Performed. Stefanjose Beau's review of systems has been performed by intake and observation. The most recent ROS was scanned into the media tab of the chart today, 12/18/2017. She has been instructed to contact her primary care physician regarding ROS issues if not already being addressed at this time. There are no recent changes. Objective:   Physical Exam  Vital Signs:  /80   Pulse 93   Ht 5' 2\" (1.575 m)   Wt 150 lb (68 kg)   BMI 27.44 kg/m²      Constitution:  Generally, Roshni Leon is  alert,  appears stated age, and  in no distress.   Her general body habitus is  Cachectic   Thin   Normal   Obese   Morbidly Obese    Head:  Normocephalic  Eyes:  Extra-occular muscles intact     Wears glasses  Left Ear:  External Ear normal Right Ear:  External Ear normal   Nose:  Normal  Mouth:  Oral mucosa moist   No perioral lesions    Pulmonary:  Respirations unlabored and regular  Skin:  Warm  Well perfused     Psychiatric:    Good judgement and insight   Oriented to  person,  place, and  time. Mood appropriate for circumstances. Gait:   Gait is  Normal   Impaired Normal post-op  Assistive Device:  None   Knee Brace   Cane   Crutches    Walker    Wheelchair   Other    ORTHOPAEDIC KNEE EXAM: LEFT   Inspection:   Skin intact without abrasion or lacerations. Incisions with no signs of infection (red, warmth, drainage) and healing well   Incisions well healed   Ecchymosis:   none   mild   moderate   severe   Atrophy:   none   mild   moderate   severe   Effusion:  none   mild   moderate   severe   Scar / Surgical incision(s): A-Scope Portals   Open Surgical Incision(s)    Alignment:   Normal   Varus  Valgus    Range of Motion:   Normal Knee ROM          Deferred: acute injury/post-surgery/pain    Limited ROM: Normal post-op   secondary to normal post-op pain/limitations    Palpation:    No Tenderness   Tenderness: Normal post-op  mild   moderate   severe  secondary to normal post-op pain/limitations   Baker's Cyst:   none   small   medium   large   Keyon's Sign:  negative   positive    Motor Function:    No gross motor weakness   Motor weakness: Normal post-op  mild   moderate   severe    secondary to normal post-op pain/limitations    Neurologic:   Sensation to light touch intact    Coordination / proprioception intact    Circulation:   The limb is warm and well perfused   Capillary refill is intact. Edema   none   mild   moderate   severe   Venous stasis changes   none   mild   moderate   severe    Data Reviewed:     XRays:  (3 views: AP, lateral and patella views) of her left knee taken today 12/18/17 in the office and reviewed by me personally showed a satisfactory TKR with no evidence of acute fracture or loosening.  There is a small osteophyte noted on AP view on the lateral aspect of the femur. A copy of the AP XRay was given to her and reviewed with her today. Assessment (Medical Decision Making): Aftercare following joint replacement surgery:   (ICD10 code: Z47.1)       Left TKR (Total knee replacement) (ICD10 code: Z80.200)     Elizabeth Archer is a 56y.o. year old female with the following diagnosis:    1. S/P TKR (total knee replacement) using cement, left on 12/6/2017     2. Chronic pain of left knee  XR KNEE LEFT (3 VIEWS)       Her overall course:         Responding adequately to ongoing treatment after surgery      Worsening despite postoperative treatment       Unchanged despite postoperative treatment     Plan (Medical Decision Making):        Continue deep venous thrombosis prophylaxis:    OMAR Bolaños    Aspirin 325 mg PO BID x 6 weeks post-op. Once she has finished her aspirin she may start meloxicam. We did discuss her not taking any other anti-inflammatories while taking the meloxicam.    Continue physical therapy    Continue pain control: Ice and Percocet    Ice to knee. The staples were removed today. Steri-strips were applied. Elizabeth Archer has been instructed to remove the steri-strips in two days then she can start to rub Vit E oil on the incision(s) once a day. Refills/scripts given today: Meloxicam and Percocet          OARRS ([place] Automated Rx Reporting System):                               OARRS Report on Elizabeth Archer was run: 11/13/17. Active Cumulative Morphine Equivalent (No patient found). OARRS report scanned into media. Patient being given increased dosage/quantity of opioid pain medication in excess of OSMB guidelines which noted a 30 MED of opioids due to the fact that she has undergone major orthopaedic surgery as outlined in rule 4731-11-13.   Dosages and further duration of the pain medication will be re-evaluated at her post op visit. Patient was educated on dosing expectations and limits of prescribing as a result of the new [facility] Board rules enacted August 31, 2017. Please also note that this is not the initial opioid prescription issued to this patient but a continuation of medication utilized during the hospital admission as noted in the medical record. OARRS report has also been utilized to screen for any abuse history or suspicious activity as outlined in [place]. All efforts have been taken to prevent abuse potential and misuse of opioid medications. Return to Clinic/Follow - Up:  Roshni Leon was asked to make a follow-up appointment:     4-6 weeks. Roshni Leon was instructed to call the office if her symptoms worsen or if new symptoms appear prior to the next scheduled visit. She is specifically instructed to contact the office between now & her scheduled appointment if she has concerns related to her condition. She is welcome to call for an appointment sooner if she has any additional concerns or questions.           Patient Instructions     PATIENT INSTRUCTIONS  For Roshni Leon  Office Visit with Jo Arshad PA-C on 12/18/2017    Activity & PT Instructions:   Knee immobilizer and OMAR hose to be used until instructed by MD/PT   Avoid painful activities   No sports until cleared by MD/PT   No driving if you are taking narcotic pain medications or muscle relaxers   Keep extremity elevated   PT or Home Exercise Program for 3 months post-op    Note: PT is usually twice a week for 3 months (will be extended if necessary)   \"Remember to: [place] and [employer]" your knee as instructed by Dr Vasques Serum    Weight Bearing:   Use a walker until instructed by MD/PT   Use crutches or a walker if your knee is painful   May be partial weightbearing on operative leg   Advance to full weightbearing as tolerated once cleared by MD/PT    Wound Instructions: Once sutures/staples are removed, use Vit E oil on incisions once a day   If in the sun, protect incisions from sun by using:     Sunscreen 30-50 SPF, reapply regularly. Elevate operative limb above heart level   Ice to knee for 15  20 minutes 3 x day    (\"ICE IS YOUR FRIEND\": try using a bag of frozen peas or corn)      Call the office ([phone], Option 3) if:     The incision becomes red, swollen or develops drainage. You develop a fever greater than 101 degrees. Follow-up Care:   I have asked Saurav De La O to schedule a follow-up appointment in 4 to 6 weeks. She is specifically instructed to contact the office between now & her scheduled appointment if she has concerns related to her condition. She is welcome to call for an appointment sooner if she has any additional concerns or questions. Medication Instructions: for Saurav De La O  Allergies: Review of patient's allergies indicates no known allergies. Any prescriptions must be used as directed. Narcotic prescriptions will be printed out and given to you in the office. Non-narcotic prescriptions will be sent to your pharmacy for pickup to be use as directed unless you request a printed prescription. If you have any concerns, questions or require refills, please contact our office ([phone], Option 3). If you experience any adverse reactions, stop the medication and call our office immediately. Aspirin Instructions: The aspirin (325 mg by mouth twice a day) Dr Mateusz Burgos asked you to take is used to prevent blood clots. The length of time you will need to take aspirin is for 6 weeks post-op if you are not on any other type of blood thinner (plavix, coumadin, etc). Since aspirin does prevent clotting, you may be prone to excessive bleeding. If you experience signs and symptoms of bleeding and clotting problems, you must seek emergent care.  You need to inform other health-care providers (ie family physicians, dentists, etc) that you are taking aspirin, especially if any procedures are planned. Call the office ([phone], Option 3) if you have any questions regarding your aspirin therapy. Antibiotics after Total Knee Replacements:      Prophylactic antibiotics before routine dental cleaning are not required. Other surgeries/procedures do require antibiotics (colonoscopy, abcesses/ active infections, etc.)    If you are NOT allergic to penicillin: 2 grams of amoxicillin, cephalexin or cephradine (orally) OR 2 grams of ampicillin or 1 gram of cefazolin (intra-muscularly or intravenously) 1 hour before the procedure. If you ARE allergic to penicillin[de-identified] 600 milligrams of clindamycin (orally or intravenously) 1 hour before the procedure. PLEASE NOTE: Antibiotics may vary based on clinical situation and culture results. PATIENT REMINDER:   Carry a list of your medications and allergies with you at all times. Call your pharmacy and our office at least 1 week in advance to refill prescriptions. Narcotic medications will not be refilled after hours or on weekends. ATTENTION    As of October 2, 2014, the [facility] ([address] [place] [address]) has mandated that ALL PRESCRIPTION PAIN MEDICINE cannot be called into your pharmacy. This includes:    Oxycodone (Percocet)  Hydrocodone (Vicodin, Norco)  Tramadol (Ultram)  Other    These medications must have prescriptions which are written and signed by your provider. This means that you must call ahead and come in to the office to  the paper prescription and take it to your pharmacy. We are sorry for any inconvenience but this is now the law. Adriana Clinton PA-C  Physician Assistant, Orthopaedic Surgery    Contact Information:  Desire Mcclendon,  & Clinical Staff  [phone], Option 3         IMPORTANT NARCOTIC INFORMATION: PLEASE READ      DO NOT share your prescription medication with anyone!   Sharing is 2).  CDC Handouts: How to prevent falls, Home Safety Fall Prevention         If you or someone you know struggles with weight issues and joint pain, please check out this important documentary:      \"Start Moving Start Living\" =  Http://startmovingDestineerving.RadioFrame       (YOUTUBE video SMSL FULL SD)              Orders Placed This Encounter   Procedures    XR KNEE LEFT (3 VIEWS)     Standing Status:   Future     Number of Occurrences:   1     Standing Expiration Date:   12/18/2018     Order Specific Question:   Reason for exam:     Answer:   Pain       Refills/New Prescriptions:  No orders of the defined types were placed in this encounter. Today's prescription medications will be e-scribed (when appropriate) to the Patient's Preferred Pharmacy:   [address] [place] [address] [phone] [address] [facility]  Phone: [phone] Fax: [phone]    [facility] [address] [place] [address], [address] [place] [address]  Phone: [phone] Fax: [phone]       Danii Valdivia PA-C  Electronically signed by Danii Valdivia PA-C on 12/18/2017 at 2:57 PM     Contact Information:  Otto Cooks, [address] [place] [address] Staff  [phone], Option 3     This dictation was performed with a verbal recognition program (DRAGON) and it was checked for errors. It is possible that there are still dictated errors within this office note. If so, please bring any errors to my attention for an addendum. All efforts were made to ensure that this office note is accurate. I have personally performed and/or participated in the history, physical exam and medical decision making and reviewed all pertinent clinical information unless otherwise noted.

## 2019-03-07 ENCOUNTER — PATIENT MESSAGE (OUTPATIENT)
Dept: FAMILY MEDICINE CLINIC | Age: 59
End: 2019-03-07

## 2019-03-07 DIAGNOSIS — Z13.1 SCREENING FOR DIABETES MELLITUS: ICD-10-CM

## 2019-03-07 DIAGNOSIS — Z13.6 SCREENING FOR CARDIOVASCULAR CONDITION: ICD-10-CM

## 2019-03-07 DIAGNOSIS — E03.4 HYPOTHYROIDISM DUE TO ACQUIRED ATROPHY OF THYROID: Primary | ICD-10-CM

## 2019-03-19 LAB
ALBUMIN SERPL-MCNC: 4.4 G/DL
ALP BLD-CCNC: 66 U/L
ALT SERPL-CCNC: 12 U/L
ANION GAP SERPL CALCULATED.3IONS-SCNC: 1.5 MMOL/L
AST SERPL-CCNC: 20 U/L
BILIRUB SERPL-MCNC: 0.3 MG/DL (ref 0.1–1.4)
BUN BLDV-MCNC: 12 MG/DL
CALCIUM SERPL-MCNC: 10.2 MG/DL
CHLORIDE BLD-SCNC: 101 MMOL/L
CHOLESTEROL, TOTAL: 284 MG/DL
CHOLESTEROL/HDL RATIO: ABNORMAL
CO2: 27 MMOL/L
CREAT SERPL-MCNC: 0.8 MG/DL
GFR CALCULATED: 83
GLUCOSE BLD-MCNC: 86 MG/DL
HDLC SERPL-MCNC: 71 MG/DL (ref 35–70)
LDL CHOLESTEROL CALCULATED: 184 MG/DL (ref 0–160)
POTASSIUM SERPL-SCNC: 5 MMOL/L
SODIUM BLD-SCNC: 142 MMOL/L
TOTAL PROTEIN: 7.4
TRIGL SERPL-MCNC: 144 MG/DL
VLDLC SERPL CALC-MCNC: 29 MG/DL

## 2019-03-22 ENCOUNTER — OFFICE VISIT (OUTPATIENT)
Dept: FAMILY MEDICINE CLINIC | Age: 59
End: 2019-03-22
Payer: COMMERCIAL

## 2019-03-22 VITALS
BODY MASS INDEX: 28.39 KG/M2 | HEART RATE: 105 BPM | WEIGHT: 155.2 LBS | DIASTOLIC BLOOD PRESSURE: 88 MMHG | SYSTOLIC BLOOD PRESSURE: 130 MMHG | OXYGEN SATURATION: 96 % | TEMPERATURE: 97.3 F

## 2019-03-22 DIAGNOSIS — Z00.00 PHYSICAL EXAM, ROUTINE: Primary | ICD-10-CM

## 2019-03-22 DIAGNOSIS — E03.4 HYPOTHYROIDISM DUE TO ACQUIRED ATROPHY OF THYROID: ICD-10-CM

## 2019-03-22 PROCEDURE — 99396 PREV VISIT EST AGE 40-64: CPT | Performed by: FAMILY MEDICINE

## 2019-03-22 RX ORDER — LEVOTHYROXINE SODIUM 112 UG/1
112 TABLET ORAL DAILY
Qty: 90 TABLET | Refills: 3 | Status: SHIPPED | OUTPATIENT
Start: 2019-03-22 | End: 2020-03-19 | Stop reason: SDUPTHER

## 2019-03-22 ASSESSMENT — PATIENT HEALTH QUESTIONNAIRE - PHQ9
SUM OF ALL RESPONSES TO PHQ QUESTIONS 1-9: 0
2. FEELING DOWN, DEPRESSED OR HOPELESS: 0
SUM OF ALL RESPONSES TO PHQ9 QUESTIONS 1 & 2: 0
SUM OF ALL RESPONSES TO PHQ QUESTIONS 1-9: 0
1. LITTLE INTEREST OR PLEASURE IN DOING THINGS: 0

## 2019-04-09 ENCOUNTER — NURSE ONLY (OUTPATIENT)
Dept: FAMILY MEDICINE CLINIC | Age: 59
End: 2019-04-09

## 2019-04-09 VITALS — BODY MASS INDEX: 29.32 KG/M2 | HEIGHT: 61 IN

## 2020-03-19 ENCOUNTER — PATIENT MESSAGE (OUTPATIENT)
Dept: FAMILY MEDICINE CLINIC | Age: 60
End: 2020-03-19

## 2020-03-19 RX ORDER — LEVOTHYROXINE SODIUM 112 UG/1
112 TABLET ORAL DAILY
Qty: 90 TABLET | Refills: 3 | Status: SHIPPED | OUTPATIENT
Start: 2020-03-19 | End: 2020-09-15 | Stop reason: SDUPTHER

## 2020-09-01 ENCOUNTER — TELEPHONE (OUTPATIENT)
Dept: FAMILY MEDICINE CLINIC | Age: 60
End: 2020-09-01

## 2020-09-01 NOTE — TELEPHONE ENCOUNTER
Patient is scheduled to have a be well within physical done but wanted to get labs done before her appointment. Patient requesting lab orders to be faxed to [facility] in [place] on [address] [facility].

## 2020-09-15 ENCOUNTER — OFFICE VISIT (OUTPATIENT)
Dept: FAMILY MEDICINE CLINIC | Age: 60
End: 2020-09-15
Payer: COMMERCIAL

## 2020-09-15 VITALS
WEIGHT: 145 LBS | TEMPERATURE: 98.2 F | SYSTOLIC BLOOD PRESSURE: 130 MMHG | BODY MASS INDEX: 26.68 KG/M2 | OXYGEN SATURATION: 99 % | DIASTOLIC BLOOD PRESSURE: 72 MMHG | HEART RATE: 61 BPM | HEIGHT: 62 IN

## 2020-09-15 PROCEDURE — 99396 PREV VISIT EST AGE 40-64: CPT | Performed by: FAMILY MEDICINE

## 2020-09-15 RX ORDER — LEVOTHYROXINE SODIUM 0.1 MG/1
100 TABLET ORAL DAILY
Qty: 90 TABLET | Refills: 1 | Status: SHIPPED | OUTPATIENT
Start: 2020-09-15 | End: 2021-02-15 | Stop reason: SDUPTHER

## 2020-09-15 ASSESSMENT — PATIENT HEALTH QUESTIONNAIRE - PHQ9
2. FEELING DOWN, DEPRESSED OR HOPELESS: 0
SUM OF ALL RESPONSES TO PHQ QUESTIONS 1-9: 0
SUM OF ALL RESPONSES TO PHQ9 QUESTIONS 1 & 2: 0
SUM OF ALL RESPONSES TO PHQ QUESTIONS 1-9: 0
1. LITTLE INTEREST OR PLEASURE IN DOING THINGS: 0

## 2020-09-15 NOTE — PATIENT INSTRUCTIONS
checked during a routine doctor visit. Your doctor will tell you how often to check your blood pressure based on your age, your blood pressure results, and other factors. · Mammogram. Ask your doctor how often you should have a mammogram, which is an X-ray of your breasts. A mammogram can spot breast cancer before it can be felt and when it is easiest to treat. · Pap test and pelvic exam. Ask your doctor how often you should have a Pap test. You may not need to have a Pap test as often as you used to. · Vision. Have your eyes checked every year or two or as often as your doctor suggests. Some experts recommend that you have yearly exams for glaucoma and other age-related eye problems starting at age 48. · Hearing. Tell your doctor if you notice any change in your hearing. You can have tests to find out how well you hear. · Diabetes. Ask your doctor whether you should have tests for diabetes. · Colorectal cancer. Your risk for colorectal cancer gets higher as you get older. Some experts say that adults should start regular screening at age 48 and stop at age 76. Others say to start before age 48 or continue after age 76. Talk with your doctor about your risk and when to start and stop screening. · Thyroid disease. Talk to your doctor about whether to have your thyroid checked as part of a regular physical exam. Women have an increased chance of a thyroid problem. · Osteoporosis. You should begin tests for bone density at age 72. If you are younger than 72, ask your doctor whether you have factors that may increase your risk for this disease. You may want to have this test before age 72. · Heart attack and stroke risk. At least every 4 to 6 years, you should have your risk for heart attack and stroke assessed. Your doctor uses factors such as your age, blood pressure, cholesterol, and whether you smoke or have diabetes to show what your risk for a heart attack or stroke is over the next 10 years.   When should you call for help? Watch closely for changes in your health, and be sure to contact your doctor if you have any problems or symptoms that concern you. Where can you learn more? Go to [email or web address] [email or web address] [email or web address] org and sign in to your Knowta account. Enter T516 in the Artspace box to learn more about \"Well Visit, Women 50 to 72: Care Instructions. \"     If you do not have an account, please click on the \"Sign Up Now\" link. Current as of: August 22, 2019               Content Version: 12.5  © 7813-6087 Healthwise, Incorporated. Care instructions adapted under license by [facility] ([facility]). If you have questions about a medical condition or this instruction, always ask your healthcare professional. [address] any warranty or liability for your use of this information.

## 2020-09-15 NOTE — PROGRESS NOTES
Subjective:   Chief Complaint:     Nate Damon is a 64 y.o. female who presents for a  physical examination. History of Present Illness:      Feels well. Eating better and exercising regularly. Has lost 10 pounds in the last year. Most recent labs show that she has overcorrected hypothyroidism. Her most recent TSH on 9/11/2020 was 0.175. Partner think she has a memory issue. Apparently she forgets things when her partner tells her when she is distracted    Past Medical History:   Diagnosis Date    Arthritis     Hypothyroidism         Review of patient's past surgical history indicates:     Past Surgical History:   Procedure Laterality Date    KNEE SURGERY Left 1976    by Dr. Helena Berumen in [facility] [address]    by Dr. Helena Berumen in [facility] Left 12/06/2017    LEFT TOTAL KNEE ARTHROPLASTY                                                             Current Outpatient Medications   Medication Sig Dispense Refill    levothyroxine (SYNTHROID) 112 MCG tablet Take 1 tablet by mouth daily 90 tablet 3     No current facility-administered medications for this visit. No Known Allergies    Social History     Tobacco Use    Smoking status: Former Smoker    Smokeless tobacco: Never Used    Tobacco comment: quit 2010   Substance Use Topics    Alcohol use: Yes     Comment: rarely    Drug use: No        No family history on file.      Review Of Systems    Skin: no abnormal pigmentation, rash, scaling, itching, masses, hair or nail changes  Eyes: negative  Ears/Nose/Throat: negative  Respiratory: negative  Cardiovascular: negative  Gastrointestinal: negative  Genitourinary: negative  Musculoskeletal: negative  Neurologic: negative  Psychiatric: negative  Hematologic/Lymphatic/Immunologic: negative  Endocrine: negative       Objective:      /72 (Site: Left Upper Arm, Position: Sitting, Cuff Size: Medium Adult)   Pulse 61   Temp 98.2 °F (36.8 °C) (Infrared)   Ht 5' 1.5\" (1.562 m)   Wt 145 lb (65.8 kg)   SpO2 99%   BMI 26.95 kg/m²   General appearance - healthy, alert, no distress  Skin - Skin color, texture, turgor normal. No rashes or lesions. Head - Normocephalic. No masses, lesions, tenderness or abnormalities  Eyes - conjunctivae/corneas clear. PERRL, EOM's intact. Ears - External ears normal. Canals clear. TM's normal.  Nose/Sinuses - Nares normal. Septum midline. Mucosa normal. No drainage or sinus tenderness. Oropharynx - Lips, mucosa, and tongue normal. Teeth and gums normal.   Neck - Neck supple. No adenopathy. Thyroid symmetric, normal size,  Back - Back symmetric, no curvature. ROM normal. No CVA tenderness. Lungs - clear to auscultation bilaterally. Breathing comfortably. Heart - Regular rate and rhythm, with no rub, murmur or gallop noted. Abdomen - Abdomen soft, non-tender. BS normal. No masses, organomegaly  Extremities - Extremities normal. No deformities, edema, or skin discoloration  Musculoskeletal - Spine ROM normal. Muscular strength intact. Peripheral pulses - radial=4/4  Neuro - Gait normal. Reflexes normal and symmetric. Sensation grossly normal.  No focal weakness    MMSE 30/30     Assessment:        Healthy 53-year-old female. Overcorrected hypothyroidism    Memory deficit likely due to distraction but not a true memory loss. Plan:   Paperwork completed. We will decrease levothyroxine to 100 mcg daily and repeat TSH in 6 weeks. Follow-up as needed. Patient declines mammogram or colonoscopy.

## 2020-09-17 ENCOUNTER — TELEPHONE (OUTPATIENT)
Dept: FAMILY MEDICINE CLINIC | Age: 60
End: 2020-09-17

## 2020-09-17 RX ORDER — CYCLOBENZAPRINE HCL 10 MG
10 TABLET ORAL 2 TIMES DAILY
Qty: 180 TABLET | Refills: 1 | Status: SHIPPED | OUTPATIENT
Start: 2020-09-17 | End: 2021-02-15 | Stop reason: SDUPTHER

## 2020-09-17 RX ORDER — NABUMETONE 750 MG/1
750 TABLET, FILM COATED ORAL 2 TIMES DAILY
Qty: 180 TABLET | Refills: 1 | Status: SHIPPED | OUTPATIENT
Start: 2020-09-17 | End: 2021-02-15 | Stop reason: SDUPTHER

## 2020-09-17 NOTE — TELEPHONE ENCOUNTER
Patient requesting refills on Relafen and Flexeril. Previously prescribed for her knee. States she has these on hand and takes them sometimes for other pain/ailments.

## 2021-02-15 DIAGNOSIS — M25.562 CHRONIC PAIN OF BOTH KNEES: ICD-10-CM

## 2021-02-15 DIAGNOSIS — G89.29 CHRONIC PAIN OF BOTH KNEES: ICD-10-CM

## 2021-02-15 DIAGNOSIS — E03.4 HYPOTHYROIDISM DUE TO ACQUIRED ATROPHY OF THYROID: ICD-10-CM

## 2021-02-15 DIAGNOSIS — M25.561 CHRONIC PAIN OF BOTH KNEES: ICD-10-CM

## 2021-02-15 RX ORDER — CYCLOBENZAPRINE HCL 10 MG
10 TABLET ORAL 2 TIMES DAILY
Qty: 180 TABLET | Refills: 1 | Status: SHIPPED | OUTPATIENT
Start: 2021-02-15

## 2021-02-15 RX ORDER — NABUMETONE 750 MG/1
750 TABLET, FILM COATED ORAL 2 TIMES DAILY
Qty: 180 TABLET | Refills: 1 | Status: SHIPPED | OUTPATIENT
Start: 2021-02-15

## 2021-02-15 RX ORDER — LEVOTHYROXINE SODIUM 0.1 MG/1
100 TABLET ORAL DAILY
Qty: 90 TABLET | Refills: 1 | Status: SHIPPED | OUTPATIENT
Start: 2021-02-15 | End: 2021-07-26 | Stop reason: SDUPTHER

## 2021-07-01 DIAGNOSIS — E03.4 HYPOTHYROIDISM DUE TO ACQUIRED ATROPHY OF THYROID: ICD-10-CM

## 2021-07-01 DIAGNOSIS — Z13.6 SCREENING FOR CARDIOVASCULAR CONDITION: Primary | ICD-10-CM

## 2021-07-01 DIAGNOSIS — Z13.1 SCREENING FOR DIABETES MELLITUS: ICD-10-CM

## 2021-07-26 ENCOUNTER — OFFICE VISIT (OUTPATIENT)
Dept: FAMILY MEDICINE CLINIC | Age: 61
End: 2021-07-26
Payer: COMMERCIAL

## 2021-07-26 VITALS
TEMPERATURE: 97 F | HEART RATE: 69 BPM | HEIGHT: 62 IN | BODY MASS INDEX: 28.23 KG/M2 | DIASTOLIC BLOOD PRESSURE: 72 MMHG | OXYGEN SATURATION: 97 % | SYSTOLIC BLOOD PRESSURE: 120 MMHG | WEIGHT: 153.4 LBS

## 2021-07-26 DIAGNOSIS — R25.2 MUSCLE CRAMPS: ICD-10-CM

## 2021-07-26 DIAGNOSIS — Z00.00 ANNUAL PHYSICAL EXAM: Primary | ICD-10-CM

## 2021-07-26 DIAGNOSIS — E03.4 HYPOTHYROIDISM DUE TO ACQUIRED ATROPHY OF THYROID: ICD-10-CM

## 2021-07-26 PROCEDURE — 99396 PREV VISIT EST AGE 40-64: CPT | Performed by: FAMILY MEDICINE

## 2021-07-26 RX ORDER — LEVOTHYROXINE SODIUM 0.1 MG/1
100 TABLET ORAL DAILY
Qty: 90 TABLET | Refills: 3 | Status: SHIPPED | OUTPATIENT
Start: 2021-07-26 | End: 2022-10-24 | Stop reason: SDUPTHER

## 2021-07-26 ASSESSMENT — PATIENT HEALTH QUESTIONNAIRE - PHQ9
SUM OF ALL RESPONSES TO PHQ QUESTIONS 1-9: 0
SUM OF ALL RESPONSES TO PHQ QUESTIONS 1-9: 0
1. LITTLE INTEREST OR PLEASURE IN DOING THINGS: 0
2. FEELING DOWN, DEPRESSED OR HOPELESS: 0
SUM OF ALL RESPONSES TO PHQ QUESTIONS 1-9: 0
SUM OF ALL RESPONSES TO PHQ9 QUESTIONS 1 & 2: 0

## 2021-07-26 NOTE — PROGRESS NOTES
Subjective:   Chief Complaint:     Morteza Marquez is a 64 y.o. female who presents for a  physical examination. History of Present Illness:      Patient with hypothyroidism. She is currently on levothyroxine 100 mcg daily. Denies any heat or cold intolerance or weight changes. No changes in bowel habits. Patient with intermittent muscle cramps. Usually after sitting working outside. Resolved with stretching    Past Medical History:   Diagnosis Date    Arthritis     Hypothyroidism         Review of patient's past surgical history indicates:     Past Surgical History:   Procedure Laterality Date    KNEE SURGERY Left 1976    by Dr. Lashell Rodas in [facility] [address]    by Dr. Lashell Rodas in [address] [place] [address] Left 12/06/2017    LEFT TOTAL KNEE ARTHROPLASTY                                                             Current Outpatient Medications   Medication Sig Dispense Refill    levothyroxine (SYNTHROID) 100 MCG tablet Take 1 tablet by mouth daily 90 tablet 1    cyclobenzaprine (FLEXERIL) 10 MG tablet Take 1 tablet by mouth 2 times daily 180 tablet 1    nabumetone (RELAFEN) 750 MG tablet Take 1 tablet by mouth 2 times daily 180 tablet 1     No current facility-administered medications for this visit. No Known Allergies    Social History     Tobacco Use    Smoking status: Former Smoker    Smokeless tobacco: Never Used    Tobacco comment: quit 2010   Substance Use Topics    Alcohol use: Yes     Comment: rarely    Drug use: No        No family history on file.      Review Of Systems    Skin: no abnormal pigmentation, rash, scaling, itching, masses, hair or nail changes  Eyes: negative  Ears/Nose/Throat: negative  Respiratory: negative  Cardiovascular: negative  Gastrointestinal: negative  Genitourinary: negative  Musculoskeletal: negative  Neurologic: negative  Psychiatric: negative  Hematologic/Lymphatic/Immunologic: negative  Endocrine: negative       Objective:      /72 (Site: Left Upper Arm, Position: Sitting, Cuff Size: Medium Adult)   Pulse 69   Temp 97 °F (36.1 °C) (Infrared)   Ht 5' 2.25\" (1.581 m)   Wt 153 lb 6.4 oz (69.6 kg)   SpO2 97%   BMI 27.83 kg/m²   General appearance - healthy, alert, no distress  Skin - Skin color, texture, turgor normal. No rashes or lesions. Head - Normocephalic. No masses, lesions, tenderness or abnormalities  Eyes - conjunctivae/corneas clear. PERRL, EOM's intact. Ears - External ears normal. Canals clear. TM's normal.  Nose/Sinuses - Nares normal. Septum midline. Mucosa normal. No drainage or sinus tenderness. Oropharynx - Lips, mucosa, and tongue normal. Teeth and gums normal.  Neck - Neck supple. No adenopathy. Thyroid symmetric, normal size,  Back - Back symmetric, no curvature. ROM normal. No CVA tenderness. Lungs -clear to auscultation bilaterally, breathing comfortably   Heart - Regular rate and rhythm, with no rub, murmur or gallop noted. Abdomen - Abdomen soft, non-tender. BS normal. No masses, organomegaly  Extremities - Extremities normal. No deformities, edema, or skin discolora  Musculoskeletal - Spine ROM normal. Muscular strength intact. Peripheral pulses - radial=4/4,, femoral=4/4, popliteal=4/4, dorsalis pedis=4/4,  Neuro - Gait normal. Reflexes normal and symmetric. Sensation grossly normal.  No focal weakness     Assessment:         Diagnosis Orders   1. Annual physical exam     2. Hypothyroidism due to acquired atrophy of thyroid  levothyroxine (SYNTHROID) 100 MCG tablet   3. Muscle cramps                   Plan:   Patient again declines mammogram and colonoscopy. Continue levothyroxine. Tracheally 60 ounces of water daily. Follow-up as needed.

## 2021-07-26 NOTE — PATIENT INSTRUCTIONS
Patient Education        Well Visit, Women 48 to 72: Care Instructions  Overview     Well visits can help you stay healthy. Your doctor has checked your overall health and may have suggested ways to take good care of yourself. Your doctor also may have recommended tests. At home, you can help prevent illness with healthy eating, regular exercise, and other steps. Follow-up care is a key part of your treatment and safety. Be sure to make and go to all appointments, and call your doctor if you are having problems. It's also a good idea to know your test results and keep a list of the medicines you take. How can you care for yourself at home? · Get screening tests that you and your doctor decide on. Screening helps find diseases before any symptoms appear. · Eat healthy foods. Choose fruits, vegetables, whole grains, protein, and low-fat dairy foods. Limit fat, especially saturated fat. Reduce salt in your diet. · Limit alcohol. Have no more than 1 drink a day or 7 drinks a week. · Get at least 30 minutes of exercise on most days of the week. Walking is a good choice. You also may want to do other activities, such as running, swimming, cycling, or playing tennis or team sports. · Reach and stay at a healthy weight. This will lower your risk for many problems, such as obesity, diabetes, heart disease, and high blood pressure. · Do not smoke. Smoking can make health problems worse. If you need help quitting, talk to your doctor about stop-smoking programs and medicines. These can increase your chances of quitting for good. · Care for your mental health. It is easy to get weighed down by worry and stress. Learn strategies to manage stress, like deep breathing and mindfulness, and stay connected with your family and community. If you find you often feel sad or hopeless, talk with your doctor. Treatment can help.   · Talk to your doctor about whether you have any risk factors for sexually transmitted infections (STIs). You can help prevent STIs if you wait to have sex with a new partner (or partners) until you've each been tested for STIs. It also helps if you use condoms (male or female condoms) and if you limit your sex partners to one person who only has sex with you. Vaccines are available for some STIs. · If you think you may have a problem with alcohol or drug use, talk to your doctor. This includes prescription medicines (such as amphetamines and opioids) and illegal drugs (such as cocaine and methamphetamine). Your doctor can help you figure out what type of treatment is best for you. · Protect your skin from too much sun. When you're outdoors from 10 a.m. to 4 p.m., stay in the shade or cover up with clothing and a hat with a wide brim. Wear sunglasses that block UV rays. Even when it's cloudy, put broad-spectrum sunscreen (SPF 30 or higher) on any exposed skin. · See a dentist one or two times a year for checkups and to have your teeth cleaned. · Wear a seat belt in the car. When should you call for help? Watch closely for changes in your health, and be sure to contact your doctor if you have any problems or symptoms that concern you. Where can you learn more? Go to [email or web address] and sign in to your [email or web address] account. Enter U737 in the [facility] box to learn more about \"Well Visit, Women 50 to 72: Care Instructions. \"     If you do not have an account, please click on the \"Sign Up Now\" link. Current as of: May 27, 2020               Content Version: 12.9  © 7577-0963 Healthwise, [employer]. Care instructions adapted under license by [address] If you have questions about a medical condition or this instruction, always ask your healthcare professional. [address] any warranty or liability for your use of this information.

## 2022-09-27 DIAGNOSIS — Z13.6 SCREENING FOR CARDIOVASCULAR CONDITION: ICD-10-CM

## 2022-09-27 DIAGNOSIS — Z13.1 SCREENING FOR DIABETES MELLITUS: ICD-10-CM

## 2022-09-27 DIAGNOSIS — E03.4 HYPOTHYROIDISM DUE TO ACQUIRED ATROPHY OF THYROID: Primary | ICD-10-CM

## 2022-10-22 LAB
A/G RATIO: 1.8 (ref 1.2–2.2)
ALBUMIN SERPL-MCNC: 4.9 G/DL (ref 3.8–4.9)
ALP BLD-CCNC: 76 IU/L (ref 44–121)
ALT SERPL-CCNC: 12 IU/L (ref 0–32)
AMBIGUOUS ABBREVIATION: NORMAL
AMBIGUOUS ABBREVIATION: NORMAL
AST SERPL-CCNC: 20 IU/L (ref 0–40)
BILIRUB SERPL-MCNC: 0.4 MG/DL (ref 0–1.2)
BUN / CREAT RATIO: 17 (ref 12–28)
BUN BLDV-MCNC: 14 MG/DL (ref 8–27)
CALCIUM SERPL-MCNC: 9.8 MG/DL (ref 8.7–10.3)
CHLORIDE BLD-SCNC: 102 MMOL/L (ref 96–106)
CHOLESTEROL, TOTAL: 283 MG/DL (ref 100–199)
CO2: 23 MMOL/L (ref 20–29)
COMMENT: ABNORMAL
CREAT SERPL-MCNC: 0.82 MG/DL (ref 0.57–1)
ESTIMATED GLOMERULAR FILTRATION RATE CREATININE EQUATION: 82 ML/MIN/1.73
GLOBULIN: 2.8 G/DL (ref 1.5–4.5)
GLUCOSE BLD-MCNC: 81 MG/DL (ref 70–99)
HDLC SERPL-MCNC: 83 MG/DL
LDL CHOLESTEROL CALCULATED: 177 MG/DL (ref 0–99)
POTASSIUM SERPL-SCNC: 4.2 MMOL/L (ref 3.5–5.2)
SODIUM BLD-SCNC: 140 MMOL/L (ref 134–144)
TOTAL PROTEIN: 7.7 G/DL (ref 6–8.5)
TRIGL SERPL-MCNC: 131 MG/DL (ref 0–149)
TSH SERPL DL<=0.05 MIU/L-ACNC: 0.53 UIU/ML (ref 0.45–4.5)
VLDLC SERPL CALC-MCNC: 23 MG/DL (ref 5–40)

## 2022-10-24 ENCOUNTER — TELEPHONE (OUTPATIENT)
Dept: FAMILY MEDICINE CLINIC | Age: 62
End: 2022-10-24

## 2022-10-24 DIAGNOSIS — E03.4 HYPOTHYROIDISM DUE TO ACQUIRED ATROPHY OF THYROID: ICD-10-CM

## 2022-10-24 RX ORDER — LEVOTHYROXINE SODIUM 0.1 MG/1
100 TABLET ORAL DAILY
Qty: 30 TABLET | Refills: 0 | Status: SHIPPED | OUTPATIENT
Start: 2022-10-24 | End: 2022-11-15 | Stop reason: SDUPTHER

## 2022-10-24 NOTE — TELEPHONE ENCOUNTER
----- Message from [facility] sent at 10/24/2022  9:10 AM EDT -----  Subject: Refill Request    QUESTIONS  Name of Medication? levothyroxine (SYNTHROID) 100 MCG tablet  Patient-reported dosage and instructions? 1 tablet by mouth daily  How many days do you have left? 8  Preferred Pharmacy? [address] phone number (if available)? [phone]  Additional Information for Provider? patient only has 8 days left on   script, she is going out of town but does have appt scheduled for november 15th for follow up .   ---------------------------------------------------------------------------  --------------  [address]  What is the best way for the office to contact you? OK to leave message on   voicemail  Preferred Call Back Phone Number? [phone]  ---------------------------------------------------------------------------  --------------  SCRIPT ANSWERS  Relationship to Patient?  Self

## 2022-11-15 ENCOUNTER — OFFICE VISIT (OUTPATIENT)
Dept: FAMILY MEDICINE CLINIC | Age: 62
End: 2022-11-15
Payer: COMMERCIAL

## 2022-11-15 VITALS
TEMPERATURE: 97.1 F | BODY MASS INDEX: 27.9 KG/M2 | OXYGEN SATURATION: 99 % | WEIGHT: 153.8 LBS | SYSTOLIC BLOOD PRESSURE: 128 MMHG | HEART RATE: 95 BPM | DIASTOLIC BLOOD PRESSURE: 78 MMHG

## 2022-11-15 DIAGNOSIS — Z00.00 PHYSICAL EXAM, ANNUAL: Primary | ICD-10-CM

## 2022-11-15 DIAGNOSIS — E03.4 HYPOTHYROIDISM DUE TO ACQUIRED ATROPHY OF THYROID: ICD-10-CM

## 2022-11-15 PROCEDURE — 99396 PREV VISIT EST AGE 40-64: CPT | Performed by: FAMILY MEDICINE

## 2022-11-15 RX ORDER — LEVOTHYROXINE SODIUM 0.1 MG/1
100 TABLET ORAL DAILY
Qty: 90 TABLET | Refills: 3 | Status: SHIPPED | OUTPATIENT
Start: 2022-11-15

## 2022-11-15 SDOH — ECONOMIC STABILITY: FOOD INSECURITY: WITHIN THE PAST 12 MONTHS, THE FOOD YOU BOUGHT JUST DIDN'T LAST AND YOU DIDN'T HAVE MONEY TO GET MORE.: NEVER TRUE

## 2022-11-15 SDOH — ECONOMIC STABILITY: FOOD INSECURITY: WITHIN THE PAST 12 MONTHS, YOU WORRIED THAT YOUR FOOD WOULD RUN OUT BEFORE YOU GOT MONEY TO BUY MORE.: NEVER TRUE

## 2022-11-15 ASSESSMENT — PATIENT HEALTH QUESTIONNAIRE - PHQ9
SUM OF ALL RESPONSES TO PHQ QUESTIONS 1-9: 0
SUM OF ALL RESPONSES TO PHQ9 QUESTIONS 1 & 2: 0
SUM OF ALL RESPONSES TO PHQ QUESTIONS 1-9: 0
SUM OF ALL RESPONSES TO PHQ QUESTIONS 1-9: 0
2. FEELING DOWN, DEPRESSED OR HOPELESS: 0
SUM OF ALL RESPONSES TO PHQ QUESTIONS 1-9: 0
1. LITTLE INTEREST OR PLEASURE IN DOING THINGS: 0

## 2022-11-15 ASSESSMENT — SOCIAL DETERMINANTS OF HEALTH (SDOH): HOW HARD IS IT FOR YOU TO PAY FOR THE VERY BASICS LIKE FOOD, HOUSING, MEDICAL CARE, AND HEATING?: NOT HARD AT ALL

## 2022-11-15 NOTE — PROGRESS NOTES
Subjective:   Chief Complaint:     Beckie Cifuentes is a 63 y.o. female who presents for a  physical examination. History of Present Illness:      Hypothyroidism: Patient complains of hypothyroidism. Symptoms include none. Patient denies change in energy level, diarrhea, heat / cold intolerance, nervousness, palpitations, and weight changes. Onset of symptoms was several days ago. Symptoms have been well-controlled. Past Medical History:   Diagnosis Date    Arthritis     Hypothyroidism         Review of patient's past surgical history indicates:     Past Surgical History:   Procedure Laterality Date    KNEE SURGERY Left 1976    by Dr. Roel Cuevas in [address] [place] [address]    by Dr. Roel Cuevas in [place]    OTHER SURGICAL HISTORY Left 12/06/2017    LEFT TOTAL KNEE ARTHROPLASTY                                                             Current Outpatient Medications   Medication Sig Dispense Refill    levothyroxine (SYNTHROID) 100 MCG tablet Take 1 tablet by mouth daily 30 tablet 0    cyclobenzaprine (FLEXERIL) 10 MG tablet Take 1 tablet by mouth 2 times daily 180 tablet 1    nabumetone (RELAFEN) 750 MG tablet Take 1 tablet by mouth 2 times daily 180 tablet 1     No current facility-administered medications for this visit. No Known Allergies    Social History     Tobacco Use    Smoking status: Former    Smokeless tobacco: Never    Tobacco comments:     quit 2010   Substance Use Topics    Alcohol use: Yes     Comment: rarely    Drug use: No        No family history on file.      Review Of Systems    Skin: no abnormal pigmentation, rash, scaling, itching, masses, hair or nail changes  Eyes: negative  Ears/Nose/Throat: negative  Respiratory: negative  Cardiovascular: negative  Gastrointestinal: negative  Genitourinary: negative  Musculoskeletal: negative  Neurologic: negative  Psychiatric: negative  Hematologic/Lymphatic/Immunologic: negative  Endocrine: negative       Objective:      /78 (Site: Left Upper Arm, Position: Sitting, Cuff Size: Medium Adult)   Pulse 95   Temp 97.1 °F (36.2 °C) (Infrared)   Wt 153 lb 12.8 oz (69.8 kg)   SpO2 99%   BMI 27.90 kg/m²   General appearance - healthy, alert, no distress  Skin - Skin color, texture, turgor normal. No rashes or lesions. Head - Normocephalic. No masses, lesions, tenderness or abnormalities  Eyes - conjunctivae/corneas clear. PERRL, EOM's intact. Ears - External ears normal. Canals clear. TM's normal.  Nose/Sinuses - Nares normal. Septum midline. Mucosa normal. No drainage or sinus tenderness. Oropharynx - Lips, mucosa, and tongue normal. Teeth and gums normal.   Neck - Neck supple. No adenopathy. Thyroid symmetric, normal size,  Back - Back symmetric, no curvature. ROM normal. No CVA tenderness. Lungs -clear to auscultation bilaterally, breathing comfortably. Heart - Regular rate and rhythm, with no rub, murmur or gallop noted. Abdomen - Abdomen soft, non-tender. BS normal. No masses, organomegaly  Extremities - Extremities normal. No deformities, edema, or skin discoloration  Musculoskeletal - Spine ROM normal. Muscular strength intact. Peripheral pulses - radial=4/4  Neuro - Gait normal. Reflexes normal and symmetric. Sensation grossly normal.  No focal weakness            Assessment:         Diagnosis Orders   1. Physical exam, annual        2. Hypothyroidism due to acquired atrophy of thyroid  levothyroxine (SYNTHROID) 100 MCG tablet                    Plan:   Continue levothyroxine at current dose due to its effectiveness.   Follow-up in 1 year or as needed

## 2023-09-24 DIAGNOSIS — Z12.31 BREAST CANCER SCREENING BY MAMMOGRAM: Primary | ICD-10-CM

## 2023-10-16 DIAGNOSIS — E03.4 HYPOTHYROIDISM DUE TO ACQUIRED ATROPHY OF THYROID: Primary | ICD-10-CM

## 2023-10-16 DIAGNOSIS — Z13.1 SCREENING FOR DIABETES MELLITUS: ICD-10-CM

## 2023-10-16 DIAGNOSIS — Z13.6 SCREENING FOR CARDIOVASCULAR CONDITION: ICD-10-CM

## 2023-11-09 ASSESSMENT — PATIENT HEALTH QUESTIONNAIRE - PHQ9
1. LITTLE INTEREST OR PLEASURE IN DOING THINGS: NOT AT ALL
1. LITTLE INTEREST OR PLEASURE IN DOING THINGS: 0
2. FEELING DOWN, DEPRESSED OR HOPELESS: 0
SUM OF ALL RESPONSES TO PHQ QUESTIONS 1-9: 0
SUM OF ALL RESPONSES TO PHQ QUESTIONS 1-9: 0
SUM OF ALL RESPONSES TO PHQ9 QUESTIONS 1 & 2: 0
SUM OF ALL RESPONSES TO PHQ QUESTIONS 1-9: 0
2. FEELING DOWN, DEPRESSED OR HOPELESS: NOT AT ALL
SUM OF ALL RESPONSES TO PHQ QUESTIONS 1-9: 0
SUM OF ALL RESPONSES TO PHQ9 QUESTIONS 1 & 2: 0

## 2023-11-15 LAB
A/G RATIO: 1.6 (ref 1.2–2.2)
ALBUMIN SERPL-MCNC: 4.4 G/DL (ref 3.9–4.9)
ALP BLD-CCNC: 68 IU/L (ref 44–121)
ALT SERPL-CCNC: 16 IU/L (ref 0–32)
AMBIGUOUS ABBREVIATION: NORMAL
AMBIGUOUS ABBREVIATION: NORMAL
AST SERPL-CCNC: 24 IU/L (ref 0–40)
BILIRUB SERPL-MCNC: 0.4 MG/DL (ref 0–1.2)
BUN / CREAT RATIO: 18 (ref 12–28)
BUN BLDV-MCNC: 14 MG/DL (ref 8–27)
CALCIUM SERPL-MCNC: 9.8 MG/DL (ref 8.7–10.3)
CHLORIDE BLD-SCNC: 102 MMOL/L (ref 96–106)
CHOLESTEROL, TOTAL: 260 MG/DL (ref 100–199)
CO2: 24 MMOL/L (ref 20–29)
COMMENT: ABNORMAL
CREAT SERPL-MCNC: 0.8 MG/DL (ref 0.57–1)
ESTIMATED GLOMERULAR FILTRATION RATE CREATININE EQUATION: 84 ML/MIN/1.73
GLOBULIN: 2.8 G/DL (ref 1.5–4.5)
GLUCOSE BLD-MCNC: 87 MG/DL (ref 70–99)
HDLC SERPL-MCNC: 81 MG/DL
LDL CHOLESTEROL CALCULATED: 162 MG/DL (ref 0–99)
POTASSIUM SERPL-SCNC: 4.7 MMOL/L (ref 3.5–5.2)
SODIUM BLD-SCNC: 139 MMOL/L (ref 134–144)
TOTAL PROTEIN: 7.2 G/DL (ref 6–8.5)
TRIGL SERPL-MCNC: 102 MG/DL (ref 0–149)
TSH SERPL DL<=0.05 MIU/L-ACNC: 0.08 UIU/ML (ref 0.45–4.5)
VLDLC SERPL CALC-MCNC: 17 MG/DL (ref 5–40)

## 2023-11-16 ENCOUNTER — OFFICE VISIT (OUTPATIENT)
Dept: FAMILY MEDICINE CLINIC | Age: 63
End: 2023-11-16
Payer: COMMERCIAL

## 2023-11-16 VITALS
HEIGHT: 62 IN | WEIGHT: 154.2 LBS | OXYGEN SATURATION: 98 % | SYSTOLIC BLOOD PRESSURE: 124 MMHG | HEART RATE: 67 BPM | DIASTOLIC BLOOD PRESSURE: 80 MMHG | TEMPERATURE: 97.7 F | BODY MASS INDEX: 28.37 KG/M2

## 2023-11-16 DIAGNOSIS — Z00.00 PHYSICAL EXAM, ANNUAL: Primary | ICD-10-CM

## 2023-11-16 DIAGNOSIS — E03.4 HYPOTHYROIDISM DUE TO ACQUIRED ATROPHY OF THYROID: ICD-10-CM

## 2023-11-16 PROCEDURE — 99396 PREV VISIT EST AGE 40-64: CPT | Performed by: FAMILY MEDICINE

## 2023-11-16 RX ORDER — LEVOTHYROXINE SODIUM 88 UG/1
88 TABLET ORAL DAILY
Qty: 90 TABLET | Refills: 3 | Status: SHIPPED | OUTPATIENT
Start: 2023-11-16

## 2023-11-16 SDOH — ECONOMIC STABILITY: INCOME INSECURITY: HOW HARD IS IT FOR YOU TO PAY FOR THE VERY BASICS LIKE FOOD, HOUSING, MEDICAL CARE, AND HEATING?: NOT HARD AT ALL

## 2023-11-16 SDOH — ECONOMIC STABILITY: HOUSING INSECURITY
IN THE LAST 12 MONTHS, WAS THERE A TIME WHEN YOU DID NOT HAVE A STEADY PLACE TO SLEEP OR SLEPT IN A SHELTER (INCLUDING NOW)?: NO

## 2023-11-16 SDOH — ECONOMIC STABILITY: FOOD INSECURITY: WITHIN THE PAST 12 MONTHS, YOU WORRIED THAT YOUR FOOD WOULD RUN OUT BEFORE YOU GOT MONEY TO BUY MORE.: NEVER TRUE

## 2023-11-16 SDOH — ECONOMIC STABILITY: FOOD INSECURITY: WITHIN THE PAST 12 MONTHS, THE FOOD YOU BOUGHT JUST DIDN'T LAST AND YOU DIDN'T HAVE MONEY TO GET MORE.: NEVER TRUE

## 2023-11-21 PROBLEM — Z53.20 MAMMOGRAM DECLINED: Status: ACTIVE | Noted: 2023-11-21
